# Patient Record
Sex: FEMALE | Race: WHITE | Employment: FULL TIME | URBAN - METROPOLITAN AREA
[De-identification: names, ages, dates, MRNs, and addresses within clinical notes are randomized per-mention and may not be internally consistent; named-entity substitution may affect disease eponyms.]

---

## 2017-01-17 PROBLEM — E78.2 MIXED HYPERLIPIDEMIA: Status: ACTIVE | Noted: 2017-01-17

## 2017-01-17 PROBLEM — M79.89 LEFT ARM SWELLING: Status: ACTIVE | Noted: 2017-01-17

## 2017-01-17 PROBLEM — Z95.2 S/P MVR (MITRAL VALVE REPLACEMENT): Status: ACTIVE | Noted: 2017-01-17

## 2018-08-02 PROBLEM — C73 PAPILLARY THYROID CARCINOMA (HCC): Status: ACTIVE | Noted: 2018-08-02

## 2018-12-17 ENCOUNTER — HOSPITAL ENCOUNTER (OUTPATIENT)
Dept: LAB | Age: 38
Discharge: HOME | End: 2018-12-17
Payer: SELF-PAY

## 2018-12-17 DIAGNOSIS — R06.02 SOB (SHORTNESS OF BREATH): ICD-10-CM

## 2018-12-17 LAB
BASOPHILS # BLD: 0.1 K/UL (ref 0–0.2)
BASOPHILS NFR BLD: 1 % (ref 0–2)
DIFFERENTIAL METHOD BLD: ABNORMAL
EOSINOPHIL # BLD: 0.4 K/UL (ref 0–0.8)
EOSINOPHIL NFR BLD: 5 % (ref 0.5–7.8)
ERYTHROCYTE [DISTWIDTH] IN BLOOD BY AUTOMATED COUNT: 13.4 % (ref 11.9–14.6)
HCT VFR BLD AUTO: 36.3 % (ref 35.8–46.3)
HGB BLD-MCNC: 12.4 G/DL (ref 11.7–15.4)
IMM GRANULOCYTES # BLD: 0 K/UL (ref 0–0.5)
IMM GRANULOCYTES NFR BLD AUTO: 0 % (ref 0–5)
LYMPHOCYTES # BLD: 1.2 K/UL (ref 0.5–4.6)
LYMPHOCYTES NFR BLD: 18 % (ref 13–44)
MCH RBC QN AUTO: 31 PG (ref 26.1–32.9)
MCHC RBC AUTO-ENTMCNC: 34.2 G/DL (ref 31.4–35)
MCV RBC AUTO: 90.8 FL (ref 79.6–97.8)
MONOCYTES # BLD: 0.6 K/UL (ref 0.1–1.3)
MONOCYTES NFR BLD: 8 % (ref 4–12)
NEUTS SEG # BLD: 4.7 K/UL (ref 1.7–8.2)
NEUTS SEG NFR BLD: 68 % (ref 43–78)
NRBC # BLD: 0 K/UL (ref 0–0.2)
PLATELET # BLD AUTO: 256 K/UL (ref 150–450)
PMV BLD AUTO: 10.6 FL (ref 9.4–12.3)
RBC # BLD AUTO: 4 M/UL (ref 4.05–5.2)
WBC # BLD AUTO: 6.9 K/UL (ref 4.3–11.1)

## 2018-12-17 PROCEDURE — 36415 COLL VENOUS BLD VENIPUNCTURE: CPT

## 2018-12-17 PROCEDURE — 85025 COMPLETE CBC W/AUTO DIFF WBC: CPT

## 2019-02-06 PROBLEM — Z95.0 PACEMAKER: Status: ACTIVE | Noted: 2019-02-06

## 2019-02-06 PROBLEM — Z87.74 H/O CONGENITAL ATRIAL SEPTAL DEFECT (ASD) REPAIR: Status: ACTIVE | Noted: 2019-02-06

## 2021-03-08 ENCOUNTER — HOSPITAL ENCOUNTER (OUTPATIENT)
Dept: LAB | Age: 41
Discharge: HOME | End: 2021-03-08
Payer: MEDICAID

## 2021-03-08 DIAGNOSIS — I48.0 PAF (PAROXYSMAL ATRIAL FIBRILLATION) (HCC): ICD-10-CM

## 2021-03-08 LAB
ANION GAP SERPL CALC-SCNC: 4 MMOL/L (ref 7–16)
BASOPHILS # BLD: 0.1 K/UL (ref 0–0.2)
BASOPHILS NFR BLD: 1 % (ref 0–2)
BUN SERPL-MCNC: 7 MG/DL (ref 6–23)
CALCIUM SERPL-MCNC: 8.7 MG/DL (ref 8.3–10.4)
CHLORIDE SERPL-SCNC: 106 MMOL/L (ref 98–107)
CO2 SERPL-SCNC: 29 MMOL/L (ref 21–32)
CREAT SERPL-MCNC: 0.63 MG/DL (ref 0.6–1)
DIFFERENTIAL METHOD BLD: NORMAL
EOSINOPHIL # BLD: 0.2 K/UL (ref 0–0.8)
EOSINOPHIL NFR BLD: 3 % (ref 0.5–7.8)
ERYTHROCYTE [DISTWIDTH] IN BLOOD BY AUTOMATED COUNT: 13.6 % (ref 11.9–14.6)
GLUCOSE SERPL-MCNC: 91 MG/DL (ref 65–100)
HCT VFR BLD AUTO: 36.5 % (ref 35.8–46.3)
HGB BLD-MCNC: 12.4 G/DL (ref 11.7–15.4)
IMM GRANULOCYTES # BLD AUTO: 0 K/UL (ref 0–0.5)
IMM GRANULOCYTES NFR BLD AUTO: 0 % (ref 0–5)
INR PPP: 1
LYMPHOCYTES # BLD: 1.2 K/UL (ref 0.5–4.6)
LYMPHOCYTES NFR BLD: 22 % (ref 13–44)
MAGNESIUM SERPL-MCNC: 2.2 MG/DL (ref 1.8–2.4)
MCH RBC QN AUTO: 30.5 PG (ref 26.1–32.9)
MCHC RBC AUTO-ENTMCNC: 34 G/DL (ref 31.4–35)
MCV RBC AUTO: 89.7 FL (ref 79.6–97.8)
MONOCYTES # BLD: 0.4 K/UL (ref 0.1–1.3)
MONOCYTES NFR BLD: 7 % (ref 4–12)
NEUTS SEG # BLD: 3.8 K/UL (ref 1.7–8.2)
NEUTS SEG NFR BLD: 67 % (ref 43–78)
NRBC # BLD: 0 K/UL (ref 0–0.2)
PLATELET # BLD AUTO: 260 K/UL (ref 150–450)
PMV BLD AUTO: 11.1 FL (ref 9.4–12.3)
POTASSIUM SERPL-SCNC: 4.4 MMOL/L (ref 3.5–5.1)
PROTHROMBIN TIME: 13 SEC (ref 12.5–14.7)
RBC # BLD AUTO: 4.07 M/UL (ref 4.05–5.2)
SODIUM SERPL-SCNC: 139 MMOL/L (ref 136–145)
WBC # BLD AUTO: 5.7 K/UL (ref 4.3–11.1)

## 2021-03-08 PROCEDURE — 85610 PROTHROMBIN TIME: CPT

## 2021-03-08 PROCEDURE — 83735 ASSAY OF MAGNESIUM: CPT

## 2021-03-08 PROCEDURE — 80048 BASIC METABOLIC PNL TOTAL CA: CPT

## 2021-03-08 PROCEDURE — 36415 COLL VENOUS BLD VENIPUNCTURE: CPT

## 2021-03-08 PROCEDURE — 85025 COMPLETE CBC W/AUTO DIFF WBC: CPT

## 2021-03-10 ENCOUNTER — ANESTHESIA EVENT (OUTPATIENT)
Dept: SURGERY | Age: 41
End: 2021-03-10
Payer: MEDICAID

## 2021-03-10 NOTE — PROGRESS NOTES
Patient pre-assessment complete for Pacemaker genetrator change with Dr Dayron Urias scheduled for 3/11/21 at 9:30am, arrival time 7:30am. Patient verified using . Patient instructed to bring all home medications in labeled bottles on the day of procedure. NPO status reinforced. Instructed they can take all other medications excluding vitamins & supplements. Patient verbalizes understanding of all instructions & denies any questions at this time.

## 2021-03-11 ENCOUNTER — ANESTHESIA (OUTPATIENT)
Dept: SURGERY | Age: 41
End: 2021-03-11
Payer: MEDICAID

## 2021-03-11 ENCOUNTER — APPOINTMENT (OUTPATIENT)
Dept: CARDIAC CATH/INVASIVE PROCEDURES | Age: 41
End: 2021-03-11
Attending: INTERNAL MEDICINE
Payer: MEDICAID

## 2021-03-11 ENCOUNTER — HOSPITAL ENCOUNTER (OUTPATIENT)
Age: 41
Setting detail: OUTPATIENT SURGERY
Discharge: HOME | End: 2021-03-11
Attending: INTERNAL MEDICINE | Admitting: INTERNAL MEDICINE
Payer: MEDICAID

## 2021-03-11 VITALS
SYSTOLIC BLOOD PRESSURE: 126 MMHG | RESPIRATION RATE: 15 BRPM | WEIGHT: 165 LBS | DIASTOLIC BLOOD PRESSURE: 69 MMHG | OXYGEN SATURATION: 98 % | TEMPERATURE: 98.5 F | HEART RATE: 80 BPM | BODY MASS INDEX: 29.23 KG/M2 | HEIGHT: 63 IN

## 2021-03-11 DIAGNOSIS — Z95.0 CARDIAC PACEMAKER: ICD-10-CM

## 2021-03-11 LAB
ATRIAL RATE: 63 BPM
CALCULATED P AXIS, ECG09: 50 DEGREES
CALCULATED R AXIS, ECG10: -84 DEGREES
CALCULATED T AXIS, ECG11: 60 DEGREES
DIAGNOSIS, 93000: NORMAL
P-R INTERVAL, ECG05: 176 MS
Q-T INTERVAL, ECG07: 484 MS
QRS DURATION, ECG06: 154 MS
QTC CALCULATION (BEZET), ECG08: 495 MS
VENTRICULAR RATE, ECG03: 63 BPM

## 2021-03-11 PROCEDURE — 77030031139 HC SUT VCRL2 J&J -A

## 2021-03-11 PROCEDURE — C1785 PMKR, DUAL, RATE-RESP: HCPCS

## 2021-03-11 PROCEDURE — 74011250636 HC RX REV CODE- 250/636: Performed by: INTERNAL MEDICINE

## 2021-03-11 PROCEDURE — 33228 REMV&REPLC PM GEN DUAL LEAD: CPT | Performed by: INTERNAL MEDICINE

## 2021-03-11 PROCEDURE — 93005 ELECTROCARDIOGRAM TRACING: CPT | Performed by: INTERNAL MEDICINE

## 2021-03-11 PROCEDURE — 76060000032 HC ANESTHESIA 0.5 TO 1 HR: Performed by: INTERNAL MEDICINE

## 2021-03-11 PROCEDURE — 77030031304 HC WAVWGD EIGR DISP INVO -D

## 2021-03-11 PROCEDURE — 74011000250 HC RX REV CODE- 250: Performed by: INTERNAL MEDICINE

## 2021-03-11 PROCEDURE — 77030041279 HC DRSG PRMSL AG MDII -B

## 2021-03-11 PROCEDURE — 77030022704 HC SUT VLOC COVD -B

## 2021-03-11 PROCEDURE — 74011250636 HC RX REV CODE- 250/636: Performed by: NURSE ANESTHETIST, CERTIFIED REGISTERED

## 2021-03-11 PROCEDURE — 77030008462 HC STPLR SKN PROX J&J -A

## 2021-03-11 PROCEDURE — 33228 REMV&REPLC PM GEN DUAL LEAD: CPT

## 2021-03-11 PROCEDURE — 74011000250 HC RX REV CODE- 250: Performed by: NURSE ANESTHETIST, CERTIFIED REGISTERED

## 2021-03-11 RX ORDER — NALOXONE HYDROCHLORIDE 0.4 MG/ML
0.04 INJECTION, SOLUTION INTRAMUSCULAR; INTRAVENOUS; SUBCUTANEOUS
Status: DISCONTINUED | OUTPATIENT
Start: 2021-03-11 | End: 2021-03-11 | Stop reason: HOSPADM

## 2021-03-11 RX ORDER — MIDAZOLAM HYDROCHLORIDE 1 MG/ML
2 INJECTION, SOLUTION INTRAMUSCULAR; INTRAVENOUS ONCE
Status: DISCONTINUED | OUTPATIENT
Start: 2021-03-11 | End: 2021-03-11 | Stop reason: HOSPADM

## 2021-03-11 RX ORDER — HYDROMORPHONE HYDROCHLORIDE 2 MG/ML
0.5 INJECTION, SOLUTION INTRAMUSCULAR; INTRAVENOUS; SUBCUTANEOUS
Status: DISCONTINUED | OUTPATIENT
Start: 2021-03-11 | End: 2021-03-11 | Stop reason: HOSPADM

## 2021-03-11 RX ORDER — SODIUM CHLORIDE, SODIUM LACTATE, POTASSIUM CHLORIDE, CALCIUM CHLORIDE 600; 310; 30; 20 MG/100ML; MG/100ML; MG/100ML; MG/100ML
INJECTION, SOLUTION INTRAVENOUS
Status: DISCONTINUED | OUTPATIENT
Start: 2021-03-11 | End: 2021-03-11 | Stop reason: HOSPADM

## 2021-03-11 RX ORDER — SODIUM CHLORIDE, SODIUM LACTATE, POTASSIUM CHLORIDE, CALCIUM CHLORIDE 600; 310; 30; 20 MG/100ML; MG/100ML; MG/100ML; MG/100ML
100 INJECTION, SOLUTION INTRAVENOUS CONTINUOUS
Status: DISCONTINUED | OUTPATIENT
Start: 2021-03-11 | End: 2021-03-11 | Stop reason: HOSPADM

## 2021-03-11 RX ORDER — PROPOFOL 10 MG/ML
INJECTION, EMULSION INTRAVENOUS AS NEEDED
Status: DISCONTINUED | OUTPATIENT
Start: 2021-03-11 | End: 2021-03-11 | Stop reason: HOSPADM

## 2021-03-11 RX ORDER — LIDOCAINE HYDROCHLORIDE 20 MG/ML
INJECTION, SOLUTION EPIDURAL; INFILTRATION; INTRACAUDAL; PERINEURAL AS NEEDED
Status: DISCONTINUED | OUTPATIENT
Start: 2021-03-11 | End: 2021-03-11 | Stop reason: HOSPADM

## 2021-03-11 RX ORDER — MIDAZOLAM HYDROCHLORIDE 1 MG/ML
2 INJECTION, SOLUTION INTRAMUSCULAR; INTRAVENOUS
Status: DISCONTINUED | OUTPATIENT
Start: 2021-03-11 | End: 2021-03-11 | Stop reason: HOSPADM

## 2021-03-11 RX ORDER — FENTANYL CITRATE 50 UG/ML
100 INJECTION, SOLUTION INTRAMUSCULAR; INTRAVENOUS ONCE
Status: DISCONTINUED | OUTPATIENT
Start: 2021-03-11 | End: 2021-03-11 | Stop reason: HOSPADM

## 2021-03-11 RX ORDER — LIDOCAINE HYDROCHLORIDE 10 MG/ML
0.1 INJECTION INFILTRATION; PERINEURAL AS NEEDED
Status: DISCONTINUED | OUTPATIENT
Start: 2021-03-11 | End: 2021-03-11 | Stop reason: HOSPADM

## 2021-03-11 RX ORDER — SODIUM CHLORIDE 0.9 % (FLUSH) 0.9 %
5 SYRINGE (ML) INJECTION AS NEEDED
Status: DISCONTINUED | OUTPATIENT
Start: 2021-03-11 | End: 2021-03-11 | Stop reason: HOSPADM

## 2021-03-11 RX ORDER — CEFAZOLIN SODIUM/WATER 2 G/20 ML
2 SYRINGE (ML) INTRAVENOUS ONCE
Status: COMPLETED | OUTPATIENT
Start: 2021-03-11 | End: 2021-03-11

## 2021-03-11 RX ORDER — OXYCODONE HYDROCHLORIDE 5 MG/1
5 TABLET ORAL
Status: DISCONTINUED | OUTPATIENT
Start: 2021-03-11 | End: 2021-03-11 | Stop reason: HOSPADM

## 2021-03-11 RX ORDER — PROPOFOL 10 MG/ML
INJECTION, EMULSION INTRAVENOUS
Status: DISCONTINUED | OUTPATIENT
Start: 2021-03-11 | End: 2021-03-11 | Stop reason: HOSPADM

## 2021-03-11 RX ADMIN — CEFAZOLIN 2 G: 1 INJECTION, POWDER, FOR SOLUTION INTRAVENOUS at 09:59

## 2021-03-11 RX ADMIN — LIDOCAINE HYDROCHLORIDE 40 MG: 20 INJECTION, SOLUTION EPIDURAL; INFILTRATION; INTRACAUDAL; PERINEURAL at 09:56

## 2021-03-11 RX ADMIN — SODIUM CHLORIDE, SODIUM LACTATE, POTASSIUM CHLORIDE, AND CALCIUM CHLORIDE: 600; 310; 30; 20 INJECTION, SOLUTION INTRAVENOUS at 09:47

## 2021-03-11 RX ADMIN — PROPOFOL 140 MCG/KG/MIN: 10 INJECTION, EMULSION INTRAVENOUS at 09:56

## 2021-03-11 RX ADMIN — PROPOFOL 50 MG: 10 INJECTION, EMULSION INTRAVENOUS at 09:56

## 2021-03-11 RX ADMIN — CEFAZOLIN SODIUM 2 G: 10 INJECTION, POWDER, FOR SOLUTION INTRAVENOUS at 14:41

## 2021-03-11 NOTE — ANESTHESIA POSTPROCEDURE EVALUATION
Procedure(s):  PACEMAKER GEN CHANGE.    total IV anesthesia    Anesthesia Post Evaluation      Multimodal analgesia: multimodal analgesia used between 6 hours prior to anesthesia start to PACU discharge  Patient location during evaluation: PACU  Patient participation: complete - patient participated  Level of consciousness: awake  Pain management: adequate  Airway patency: patent  Anesthetic complications: no  Cardiovascular status: acceptable  Respiratory status: spontaneous ventilation and acceptable  Hydration status: acceptable  Post anesthesia nausea and vomiting:  none      INITIAL Post-op Vital signs:   Vitals Value Taken Time   BP 96/78 03/11/21 1227   Temp     Pulse 79 03/11/21 1253   Resp     SpO2 99 % 03/11/21 1253   Vitals shown include unvalidated device data.

## 2021-03-11 NOTE — PROGRESS NOTES
TRANSFER - IN REPORT:    Verbal report received from [address] on Bunny Rued being received from EP LAB for routine progression of care      Report consisted of patients Situation, Background, Assessment and Recommendations(SBAR). Information from the following report(s) Procedure Summary was reviewed with the receiving nurse. Opportunity for questions and clarification was provided. Assessment completed upon patients arrival to unit and care assumed.

## 2021-03-11 NOTE — PROGRESS NOTES
Patient received to [address] [place] [address] room # 9  Ambulatory from [facility]. Patient scheduled for PPM gen change today with Dr Dayron Urias. Procedure reviewed & questions answered, voiced good understanding consent obtained & placed on chart. All medications and medical history reviewed. Will prep patient per orders. Patient & family updated on plan of care.       The patient has a fraility score of 3-MANAGING WELL, based on ability to perform ADLS by self

## 2021-03-11 NOTE — PROGRESS NOTES
Ambulated patient in hallway and back to bed. Left SC incision with old oozing. No hematoma or bleeding.

## 2021-03-11 NOTE — PROCEDURES
Pre-Procedure Diagnosis  1. PPM generator OLENA. 2. Documented non-reversible symptomatic bradycardia due to second degree and/or third degree atrioventricular block. Procedure Performed  1. Dual Chamber PPM Gen Change    Anesthesia: MAC     Estimated Blood Loss: Less than 10 mL     Specimens: * No specimens in log *     Patient Information and Indications: The procedure, indications, risks, benefits, and alternatives were discussed with the patient and family members, who desired to proceed after questions were answered and informed consent was documented. Procedure: After informed consent was obtained, the patient was brought to the Electrophysiology Laboratory in a fasting state and was prepped and draped in sterile fashion. Prophylactic antibiotic was administered prior to skin incision: (Ancef 2gms). Local anesthetic (sensorcaine) was delivered to the left pectoral region and an incision was made directly over the lower prior surgical scar. The subcutaneous pocket was opened using blunt dissection and electrocautery, and adequate hemostasis was established. The device was freed from overlying fibrotic tissue and the leads freed to give enough slack for device exchange. The leads were individually removed from the old generator, the lead pins were then cleaned with antibiotic soaked gauze, dried gently, and attached to a new pacemaker generator. Pins were directly observed to pass the tip electrode, and the ring hex wrench screws were secured, and leads tug tested. The device and leads were gently positioned within the pocket. The pocket was irrigated copiously with a saline antimicrobial solution prior to device positioning. The device and leads were tested a second time prior to pocket closure. The wound was closed with multiple layers of absorbable suture followed by skin closure with staples. The patient tolerated the procedure well and left the lab in good condition.      Pulse Generator Model #  Serial # Location Implant/Explant   [id number] [id number] [id number] Left Pectoral Implant   [id number] [employer] [id number] Left Pectoral Explant     Lead Model Number  Serial Number Lead position Implant/Explant   RA [id number] [id number] [id number] RA Appendage Implanted on 10/13/2004   RV [id number] Biotronik [id number] RV Faber Implanted on 2/13/2012     Lead Sensitivity and Threshold  Lead R or P sensitivity (mv) Threshold (V) Threshold PW (msec) Impedance (ohms) Final output Voltage (V) Final PW (msec)   RA 2.4 1.1 0.4 429 2.0 0.4   RV -- 1.4 0.4 468 3.0 0.4     Bradycardia Settings  Lion Mode LRL URL Pace AVD (ms) Sense AVD (ms) Rate Response Mode Switching Mode SW Rate   DDD-CLS 60 130 140 140 On On 667       Complications: None     Impression: 1. Successful dual chamber PPM generator replacement. Hawk Mathew MD, MS  Clinical Cardiac Electrophysiology  3/11/2021  10:29 AM

## 2021-03-11 NOTE — ANESTHESIA PREPROCEDURE EVALUATION
Relevant Problems   No relevant active problems       Anesthetic History     PONV          Review of Systems / Medical History  Patient summary reviewed and pertinent labs reviewed    Pulmonary            Asthma : well controlled    Comments: Covid Infection Jan 2021   Neuro/Psych   Within defined limits           Cardiovascular      Valvular problems/murmurs (s/p MVR)      Dysrhythmias (PAF. Robert-Gant Syndrome)       Exercise tolerance: >4 METS  Comments: ASD repair at [facility] age 8; MVR at [facility] in 2009. Pacemaker since age 8. Feb 2021- Echo normal SF, EF 65%, ind DF, MVR mean gradient 7 (down from 10), no MR.   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

## 2021-03-11 NOTE — DISCHARGE INSTRUCTIONS
Post Op Device Instructions        Incision & Dressing Care   Please keep Aquacel dressing on wound until your 2 week follow up in device clinic. The dressing promotes healing and is meant to stay on the wound. Keep incision site completely dry for 48 hours. During this time you may take a sponge bath, but no shower. After 48 hours you may shower with your back to the water letting the water run over the dressing. Do not let the water directly hit the dressing, it is water resistant no water proof. Do not use any lotions, creams, or ointments on the incision. Avoid manipulating the device or leads with your fingers. Do not massage or excessively rub the implant site. This could displace the leads      For four weeks after your implant   Do not lift anything heavier than a gallon of milk. Do not raise your elbow above the level of your shoulder on the Left shoulder implant side. Avoid excessive pushing, pulling, or twisting. Call you doctor for any of the following:   Any signs of infection, including redness, swelling or pain at the incision site, or a   temperature of 101° F or greater. If you have twitching chest muscles, hiccups that won't stop, or a swollen arm on the   side of the incision. Any feelings of light-headedness, chest pain, or shortness of breath. Please notify your doctors and dentists that you have an pacemaker. You should not drive until 1 week after your implant or after your first follow-up appointment, whichever comes first. At that time, you can discuss when you may return to your regular driving routine. About 1 month after implant, you will receive a card by mail from the company who manufactured your pacemaker. Your device was manufactured by [employer]. You should carry this card with you at all times. Please call the [address] [place] [address] Drive at  [phone] if you have questions or concerns about your device.  Please call the hospital if it is after 5 pm or the weekend please page the on call cardiologist at [facility] at [address] will need to have your device checked 1- 2 weeks after the procedure and should have an appointment with the device clinic. If you do not hear from them call the device clinic.

## 2021-03-16 PROBLEM — E89.0 POSTOPERATIVE HYPOTHYROIDISM: Status: ACTIVE | Noted: 2019-01-10

## 2021-06-02 PROBLEM — Z79.899 ENCOUNTER FOR MEDICATION MANAGEMENT: Status: ACTIVE | Noted: 2021-06-02

## 2021-06-02 PROBLEM — G43.009 MIGRAINE WITHOUT AURA AND WITHOUT STATUS MIGRAINOSUS, NOT INTRACTABLE: Status: ACTIVE | Noted: 2021-06-02

## 2021-06-02 PROBLEM — G45.9 TIA (TRANSIENT ISCHEMIC ATTACK): Status: ACTIVE | Noted: 2021-06-02

## 2021-06-02 PROBLEM — R20.2 PARESTHESIA: Status: ACTIVE | Noted: 2021-06-02

## 2021-06-02 PROBLEM — G81.94 LEFT HEMIPARESIS (HCC): Status: ACTIVE | Noted: 2021-06-02

## 2021-06-21 ENCOUNTER — HOSPITAL ENCOUNTER (OUTPATIENT)
Dept: CT IMAGING | Age: 41
Discharge: HOME | End: 2021-06-21
Attending: PSYCHIATRY & NEUROLOGY
Payer: MEDICAID

## 2021-06-21 DIAGNOSIS — G45.9 TIA (TRANSIENT ISCHEMIC ATTACK): ICD-10-CM

## 2021-06-21 PROCEDURE — 70450 CT HEAD/BRAIN W/O DYE: CPT

## 2021-06-24 NOTE — PROGRESS NOTES
Reported and reviewed images. Brain images without abnormality. The sligh changes at the sinuses non-specific. No neurologic change to recommend. See PCP or allergist about the sinuses if symptomatic.        Faustino Hatfield MD  Consultative Neurology, Neurodiagnostics and Neurotherapeutics  Neuroelectrophysiology, EEG, EMG  [facility]  [address] [facility], [address] [facility]  Phone:  [phone]  Fax:   [phone]

## 2021-07-26 PROBLEM — F43.10 PTSD (POST-TRAUMATIC STRESS DISORDER): Status: ACTIVE | Noted: 2021-07-26

## 2022-02-14 ENCOUNTER — HOSPITAL ENCOUNTER (OUTPATIENT)
Dept: LAB | Age: 42
Discharge: HOME | End: 2022-02-14
Payer: MEDICAID

## 2022-02-14 ENCOUNTER — HOSPITAL ENCOUNTER (OUTPATIENT)
Dept: GENERAL RADIOLOGY | Age: 42
Discharge: HOME | End: 2022-02-14
Payer: MEDICAID

## 2022-02-14 DIAGNOSIS — R06.02 SHORTNESS OF BREATH: ICD-10-CM

## 2022-02-14 LAB
BASOPHILS # BLD: 0.1 K/UL (ref 0–0.2)
BASOPHILS NFR BLD: 1 % (ref 0–2)
BNP SERPL-MCNC: 191 PG/ML (ref 5–125)
DIFFERENTIAL METHOD BLD: NORMAL
EOSINOPHIL # BLD: 0.1 K/UL (ref 0–0.8)
EOSINOPHIL NFR BLD: 2 % (ref 0.5–7.8)
ERYTHROCYTE [DISTWIDTH] IN BLOOD BY AUTOMATED COUNT: 13.8 % (ref 11.9–14.6)
HCT VFR BLD AUTO: 39.7 % (ref 35.8–46.3)
HGB BLD-MCNC: 13.4 G/DL (ref 11.7–15.4)
IMM GRANULOCYTES # BLD AUTO: 0 K/UL (ref 0–0.5)
IMM GRANULOCYTES NFR BLD AUTO: 0 % (ref 0–5)
LYMPHOCYTES # BLD: 1.7 K/UL (ref 0.5–4.6)
LYMPHOCYTES NFR BLD: 31 % (ref 13–44)
MCH RBC QN AUTO: 29.9 PG (ref 26.1–32.9)
MCHC RBC AUTO-ENTMCNC: 33.8 G/DL (ref 31.4–35)
MCV RBC AUTO: 88.6 FL (ref 79.6–97.8)
MONOCYTES # BLD: 0.4 K/UL (ref 0.1–1.3)
MONOCYTES NFR BLD: 8 % (ref 4–12)
NEUTS SEG # BLD: 3.2 K/UL (ref 1.7–8.2)
NEUTS SEG NFR BLD: 58 % (ref 43–78)
NRBC # BLD: 0 K/UL (ref 0–0.2)
PLATELET # BLD AUTO: 257 K/UL (ref 150–450)
PMV BLD AUTO: 11.3 FL (ref 9.4–12.3)
RBC # BLD AUTO: 4.48 M/UL (ref 4.05–5.2)
WBC # BLD AUTO: 5.4 K/UL (ref 4.3–11.1)

## 2022-02-14 PROCEDURE — 36415 COLL VENOUS BLD VENIPUNCTURE: CPT

## 2022-02-14 PROCEDURE — 71046 X-RAY EXAM CHEST 2 VIEWS: CPT

## 2022-02-14 PROCEDURE — 85025 COMPLETE CBC W/AUTO DIFF WBC: CPT

## 2022-02-14 PROCEDURE — 83880 ASSAY OF NATRIURETIC PEPTIDE: CPT

## 2022-03-18 PROBLEM — E78.2 MIXED HYPERLIPIDEMIA: Status: ACTIVE | Noted: 2017-01-17

## 2022-03-18 PROBLEM — Z87.74 H/O CONGENITAL ATRIAL SEPTAL DEFECT (ASD) REPAIR: Status: ACTIVE | Noted: 2019-02-06

## 2022-03-18 PROBLEM — G81.94 LEFT HEMIPARESIS (HCC): Status: ACTIVE | Noted: 2021-06-02

## 2022-03-18 PROBLEM — Z95.0 PACEMAKER: Status: ACTIVE | Noted: 2019-02-06

## 2022-03-19 PROBLEM — F43.10 PTSD (POST-TRAUMATIC STRESS DISORDER): Status: ACTIVE | Noted: 2021-07-26

## 2022-03-19 PROBLEM — G43.009 MIGRAINE WITHOUT AURA AND WITHOUT STATUS MIGRAINOSUS, NOT INTRACTABLE: Status: ACTIVE | Noted: 2021-06-02

## 2022-03-19 PROBLEM — M79.89 LEFT ARM SWELLING: Status: ACTIVE | Noted: 2017-01-17

## 2022-03-19 PROBLEM — Z95.2 S/P MVR (MITRAL VALVE REPLACEMENT): Status: ACTIVE | Noted: 2017-01-17

## 2022-03-19 PROBLEM — Z79.899 ENCOUNTER FOR MEDICATION MANAGEMENT: Status: ACTIVE | Noted: 2021-06-02

## 2022-03-19 PROBLEM — C73 PAPILLARY THYROID CARCINOMA (HCC): Status: ACTIVE | Noted: 2018-08-02

## 2022-03-19 PROBLEM — G45.9 TIA (TRANSIENT ISCHEMIC ATTACK): Status: ACTIVE | Noted: 2021-06-02

## 2022-03-19 PROBLEM — R20.2 PARESTHESIA: Status: ACTIVE | Noted: 2021-06-02

## 2022-03-20 PROBLEM — E89.0 POSTOPERATIVE HYPOTHYROIDISM: Status: ACTIVE | Noted: 2019-01-10

## 2022-06-13 ENCOUNTER — OFFICE VISIT (OUTPATIENT)
Dept: NEUROLOGY | Age: 42
End: 2022-06-13
Payer: MEDICAID

## 2022-06-13 ENCOUNTER — TELEPHONE (OUTPATIENT)
Dept: CARDIOLOGY CLINIC | Age: 42
End: 2022-06-13

## 2022-06-13 ENCOUNTER — HOSPITAL ENCOUNTER (EMERGENCY)
Dept: GENERAL RADIOLOGY | Age: 42
Discharge: HOME | End: 2022-06-16
Payer: MEDICAID

## 2022-06-13 ENCOUNTER — HOSPITAL ENCOUNTER (EMERGENCY)
Age: 42
Discharge: HOME | End: 2022-06-13
Attending: EMERGENCY MEDICINE
Payer: MEDICAID

## 2022-06-13 VITALS
TEMPERATURE: 98.8 F | WEIGHT: 169 LBS | HEIGHT: 63 IN | HEART RATE: 80 BPM | OXYGEN SATURATION: 99 % | RESPIRATION RATE: 17 BRPM | SYSTOLIC BLOOD PRESSURE: 112 MMHG | BODY MASS INDEX: 29.95 KG/M2 | DIASTOLIC BLOOD PRESSURE: 55 MMHG

## 2022-06-13 VITALS
HEIGHT: 63 IN | WEIGHT: 169 LBS | BODY MASS INDEX: 29.95 KG/M2 | SYSTOLIC BLOOD PRESSURE: 135 MMHG | HEART RATE: 76 BPM | DIASTOLIC BLOOD PRESSURE: 70 MMHG

## 2022-06-13 DIAGNOSIS — G45.9 TIA (TRANSIENT ISCHEMIC ATTACK): ICD-10-CM

## 2022-06-13 DIAGNOSIS — R07.9 ACUTE CHEST PAIN: Primary | ICD-10-CM

## 2022-06-13 DIAGNOSIS — Z95.3 STATUS POST MITRAL VALVE REPLACEMENT WITH BIOPROSTHETIC VALVE: ICD-10-CM

## 2022-06-13 DIAGNOSIS — G43.009 MIGRAINE WITHOUT AURA AND WITHOUT STATUS MIGRAINOSUS, NOT INTRACTABLE: Primary | ICD-10-CM

## 2022-06-13 DIAGNOSIS — Z79.899 ENCOUNTER FOR MEDICATION MANAGEMENT: ICD-10-CM

## 2022-06-13 DIAGNOSIS — H81.10 BENIGN PAROXYSMAL POSITIONAL VERTIGO, UNSPECIFIED LATERALITY: ICD-10-CM

## 2022-06-13 LAB
ALBUMIN SERPL-MCNC: 3.8 G/DL (ref 3.5–5)
ALBUMIN/GLOB SERPL: 1.1 {RATIO} (ref 1.2–3.5)
ALP SERPL-CCNC: 84 U/L (ref 50–130)
ALT SERPL-CCNC: 37 U/L (ref 12–65)
ANION GAP SERPL CALC-SCNC: 7 MMOL/L (ref 7–16)
AST SERPL-CCNC: 19 U/L (ref 15–37)
BILIRUB SERPL-MCNC: 0.4 MG/DL (ref 0.2–1.1)
BUN SERPL-MCNC: 7 MG/DL (ref 6–23)
CALCIUM SERPL-MCNC: 9.1 MG/DL (ref 8.3–10.4)
CHLORIDE SERPL-SCNC: 104 MMOL/L (ref 98–107)
CO2 SERPL-SCNC: 27 MMOL/L (ref 21–32)
CREAT SERPL-MCNC: 0.77 MG/DL (ref 0.6–1)
ERYTHROCYTE [DISTWIDTH] IN BLOOD BY AUTOMATED COUNT: 12.8 % (ref 11.9–14.6)
GLOBULIN SER CALC-MCNC: 3.4 G/DL (ref 2.3–3.5)
GLUCOSE SERPL-MCNC: 91 MG/DL (ref 65–100)
HCT VFR BLD AUTO: 38.3 % (ref 35.8–46.3)
HGB BLD-MCNC: 13.2 G/DL (ref 11.7–15.4)
MCH RBC QN AUTO: 30.3 PG (ref 26.1–32.9)
MCHC RBC AUTO-ENTMCNC: 34.5 G/DL (ref 31.4–35)
MCV RBC AUTO: 88 FL (ref 79.6–97.8)
NRBC # BLD: 0 K/UL (ref 0–0.2)
PLATELET # BLD AUTO: 252 K/UL (ref 150–450)
PMV BLD AUTO: 9.8 FL (ref 9.4–12.3)
POTASSIUM SERPL-SCNC: 4.1 MMOL/L (ref 3.5–5.1)
PROT SERPL-MCNC: 7.2 G/DL (ref 6.3–8.2)
RBC # BLD AUTO: 4.35 M/UL (ref 4.05–5.2)
SODIUM SERPL-SCNC: 138 MMOL/L (ref 136–145)
TROPONIN I SERPL HS-MCNC: 7.9 PG/ML (ref 0–14)
WBC # BLD AUTO: 5.3 K/UL (ref 4.3–11.1)

## 2022-06-13 PROCEDURE — 93005 ELECTROCARDIOGRAM TRACING: CPT | Performed by: EMERGENCY MEDICINE

## 2022-06-13 PROCEDURE — 94762 N-INVAS EAR/PLS OXIMTRY CONT: CPT

## 2022-06-13 PROCEDURE — 84484 ASSAY OF TROPONIN QUANT: CPT

## 2022-06-13 PROCEDURE — 80053 COMPREHEN METABOLIC PANEL: CPT

## 2022-06-13 PROCEDURE — 71046 X-RAY EXAM CHEST 2 VIEWS: CPT

## 2022-06-13 PROCEDURE — 99214 OFFICE O/P EST MOD 30 MIN: CPT | Performed by: PSYCHIATRY & NEUROLOGY

## 2022-06-13 PROCEDURE — 99285 EMERGENCY DEPT VISIT HI MDM: CPT

## 2022-06-13 PROCEDURE — 85027 COMPLETE CBC AUTOMATED: CPT

## 2022-06-13 RX ORDER — FAMOTIDINE 20 MG/1
20 TABLET, FILM COATED ORAL 2 TIMES DAILY
Qty: 30 TABLET | Refills: 0 | Status: SHIPPED | OUTPATIENT
Start: 2022-06-13 | End: 2022-06-28

## 2022-06-13 RX ORDER — SODIUM CHLORIDE 0.9 % (FLUSH) 0.9 %
3 SYRINGE (ML) INJECTION EVERY 8 HOURS
Status: DISCONTINUED | OUTPATIENT
Start: 2022-06-13 | End: 2022-06-13 | Stop reason: HOSPADM

## 2022-06-13 RX ORDER — ONDANSETRON 4 MG/1
4 TABLET, FILM COATED ORAL 3 TIMES DAILY PRN
Qty: 30 TABLET | Refills: 0 | Status: SHIPPED | OUTPATIENT
Start: 2022-06-13

## 2022-06-13 ASSESSMENT — PAIN SCALES - GENERAL: PAINLEVEL_OUTOF10: 4

## 2022-06-13 ASSESSMENT — ENCOUNTER SYMPTOMS
EYES NEGATIVE: 1
GASTROINTESTINAL NEGATIVE: 1
RESPIRATORY NEGATIVE: 1
VOMITING: 0
SHORTNESS OF BREATH: 1
BACK PAIN: 1
ALLERGIC/IMMUNOLOGIC NEGATIVE: 1
BACK PAIN: 0
COUGH: 0
ABDOMINAL PAIN: 1

## 2022-06-13 ASSESSMENT — PAIN - FUNCTIONAL ASSESSMENT: PAIN_FUNCTIONAL_ASSESSMENT: 0-10

## 2022-06-13 ASSESSMENT — VISUAL ACUITY: OU: 1

## 2022-06-13 ASSESSMENT — PAIN DESCRIPTION - LOCATION: LOCATION: CHEST

## 2022-06-13 ASSESSMENT — PAIN DESCRIPTION - DESCRIPTORS: DESCRIPTORS: PRESSURE

## 2022-06-13 NOTE — TELEPHONE ENCOUNTER
Patient called stating she feels dizzy and very tired. She said her chest feels tight and she has been gaining weight without trying. Her BP is 135/78. No active chest pain. Please call patient and advise.

## 2022-06-13 NOTE — ED TRIAGE NOTES
Pt reports 4-5 days midsternal chest heaviness/pressure and dizziness.   (-)fevers, dyspnea, LOC  Hx pacemaker, 2x open heart surgery for valve replacement  A&Ox4

## 2022-06-13 NOTE — LETTER
[address] [facility]  [address] [address]  Phone: [phone]  Fax: [phone]    Nelida Mcdaniel MD    June 13, 2022     Abbie Ramires DO  [facility] [address]    Patient: Dianne Boles   MR Number: [id number]   YOB: 1980   Date of Visit: 6/13/2022       Dear Abbie Ramires: Thank you for referring Loraine Hammond to me for evaluation/treatment. Below are the relevant portions of my assessment and plan of care. If you have questions, please do not hesitate to call me. I look forward to following [facility] along with you.     Sincerely,      Nelida Mcdaniel MD

## 2022-06-13 NOTE — ED PROVIDER NOTES
[facility] Emergency Department Provider Note                   PCP:                Zain Denton DO               Age: 43 y.o. Sex: female     No diagnosis found. DISPOSITION         New Prescriptions    No medications on file       Orders Placed This Encounter   Procedures    XR CHEST (2 VW)    Troponin    CBC    Comprehensive Metabolic Panel    Cardiac Monitor    Pulse Oximetry    Orthostatic blood pressure and pulse    EKG 12 Lead    Saline lock IV        Tomie Dakins, MD 6:56 PM      MDM  Number of Diagnoses or Management Options  Diagnosis management comments: Constant chest pressure 3 to 4 days. Occasional vertigo that is unchanged for the last number of weeks. Sporadic. Doubt pulmonary embolism. Doubt central vertigo. Check EKG and troponin. Check chest x-ray. Check for anemia. Amount and/or Complexity of Data Reviewed  Clinical lab tests: ordered and reviewed  Tests in the radiology section of CPT®: ordered and reviewed  Tests in the medicine section of CPT®: ordered and reviewed  Review and summarize past medical records: yes  Independent visualization of images, tracings, or specimens: yes (My and interpretation of EKG shows a ventricular paced rhythm at 80. Secondary ST changes. No primary T wave changes. No ectopy. Normal QT interval.)    Risk of Complications, Morbidity, and/or Mortality  Presenting problems: moderate  Diagnostic procedures: minimal  Management options: windy Hennessy is a 43 y.o. female who presents to the Emergency Department with chief complaint of    Chief Complaint   Patient presents with    Chest Pain    Dizziness      42-year-old female states 4-day history of substernal chest pressure. Nonradiating. No associated symptoms of nausea vomiting diaphoresis or shortness of breath. No fever chills or cough. Not really exertional.  No particular aggravating relieving factors. Is been with her constantly for 4 days.   No issues like this in the past.  Patient does have some occasional episodes of dizziness where she describes somewhat positional vertigo that is worse with change in position or looking in certain directions. This is been on and off for 3 weeks. Saw her primary care doctor but is not really improved. Episodes last a few seconds or a minute or 2 at a time. She had a slight left headache few days ago. Last about 24 hours. It was gradual onset. She does have some issues with dyspnea on exertion but has been somewhat chronic. Very significant past medical history with mitral valve replacement. Also repair of ASD. Records state history of paroxysmal atrial fibrillation. Patient's had COVID in the past twice. Also history of thyroidectomy due to cancer. She did have radiation treatments but this was previous. Also pacemaker placement as well. No history of coronary artery disease. She called her cardiologist today due to chest pressure and was referred here. No history of DVT or PE. The history is provided by the patient. Chest Pain  Pain location:  Substernal area  Pain quality: pressure    Pain radiates to:  Does not radiate  Pain severity:  Moderate  Onset quality:  Gradual  Duration:  4 days  Timing:  Constant  Progression:  Unchanged  Chronicity:  New  Context: at rest    Context: not breathing and not movement    Relieved by:  Nothing  Worsened by:  Nothing  Associated symptoms: abdominal pain, fatigue, headache and shortness of breath    Associated symptoms: no back pain, no cough, no diaphoresis, no fever, no palpitations, no syncope and no vomiting    Risk factors: no coronary artery disease and no diabetes mellitus          Review of Systems   Constitutional: Positive for fatigue. Negative for diaphoresis and fever. Respiratory: Positive for shortness of breath. Negative for cough. Cardiovascular: Positive for chest pain. Negative for palpitations and syncope.    Gastrointestinal: Positive for abdominal pain. Negative for vomiting. Musculoskeletal: Negative for back pain. Neurological: Positive for headaches. All other systems reviewed and are negative.       Past Medical History:   Diagnosis Date    Arrhythmia     BRADYCARDIA, S/P   pacemaker    ASD (atrial septal defect) 2/14/2012    ASD (atrial septal defect)     Asthma     mild    Cancer ([facility] [address] 2018    papillary thyroid    Chest pain     Congenital heart block     Dizziness     Dyspnea     Extremity pain     First degree AV block     GERD (gastroesophageal reflux disease)     Mitral valve prolapse     Mitral valve regurgitation 3/5/2009    Nausea & vomiting     Other ill-defined conditions(799.89) 2009    MITRAL VALVE REPAIR    Other ill-defined conditions(799.89) 1990    ASD REPAIR    Pacemaker generator end of life 2/14/2012    [facility]     Pain in unspecified limb     Palpitations     PFO (patent foramen ovale) 2/14/2012    Merritt-Henriquez syndrome     Thyroid disease     TIA (transient ischemic attack) 6/2/2021        Past Surgical History:   Procedure Laterality Date    CARDIAC VALVE SURGERY      CHOLECYSTECTOMY      LAP,CHOLECYSTECTOMY      OTHER CELL      MITRAL VALVE REPAIR    PACEMAKER PLACEMENT  03/11/2021    [facility] CARDIAC SURG PROCEDURE UNLIST      ASD repair    VA CARDIAC SURG PROCEDURE UNLIST      pacemaker x2    VA REASD W BYPASS       THYROIDECTOMY      TUBAL LIGATION          Family History   Problem Relation Age of Onset    Heart Disease Mother 46        STENT HEART    Diabetes Father            Social Connections:     Frequency of Communication with Friends and Family: Not on file    Frequency of Social Gatherings with Friends and Family: Not on file    Attends Buddhism Services: Not on file    Active Member of Clubs or Organizations: Not on file    Attends Club or Organization Meetings: Not on file    Marital Status: Not on file        Allergies Allergen Reactions    Codeine Nausea And Vomiting    Penicillins Rash        Vitals signs and nursing note reviewed. Patient Vitals for the past 4 hrs:   Temp Pulse Resp BP SpO2   06/13/22 1815 98.8 °F (37.1 °C) 83 17 123/63 99 %          Physical Exam  Vitals and nursing note reviewed. Constitutional:       Appearance: She is not ill-appearing. HENT:      Head: Normocephalic and atraumatic. Right Ear: External ear normal.      Left Ear: External ear normal.      Mouth/Throat:      Mouth: Mucous membranes are moist.      Pharynx: Oropharynx is clear. Eyes:      General: No scleral icterus. Extraocular Movements: Extraocular movements intact. Pupils: Pupils are equal, round, and reactive to light. Cardiovascular:      Rate and Rhythm: Normal rate and regular rhythm. Pulmonary:      Effort: Pulmonary effort is normal.      Breath sounds: Normal breath sounds. Abdominal:      Palpations: Abdomen is soft. Tenderness: There is no abdominal tenderness. Musculoskeletal:         General: No swelling or tenderness. Cervical back: Normal range of motion and neck supple. Right lower leg: No edema. Left lower leg: No edema. Skin:     General: Skin is warm and dry. Neurological:      General: No focal deficit present. Mental Status: She is alert. Comments: No vertigo. No drift. Normal finger-nose testing. Ambulatory. Procedures      Labs Reviewed   COMPREHENSIVE METABOLIC PANEL - Abnormal; Notable for the following components:       Result Value    Albumin/Globulin Ratio 1.1 (*)     All other components within normal limits   TROPONIN   CBC   TROPONIN        XR CHEST (2 VW)   Final Result   No consolidation.                         Results Include:    Recent Results (from the past 24 hour(s))   Troponin    Collection Time: 06/13/22  6:25 PM   Result Value Ref Range    Troponin, High Sensitivity 7.9 0 - 14 pg/mL   CBC    Collection Time: 06/13/22  6:25 PM   Result Value Ref Range    WBC 5.3 4.3 - 11.1 K/uL    RBC 4.35 4.05 - 5.2 M/uL    Hemoglobin 13.2 11.7 - 15.4 g/dL    Hematocrit 38.3 35.8 - 46.3 %    MCV 88.0 79.6 - 97.8 FL    MCH 30.3 26.1 - 32.9 PG    MCHC 34.5 31.4 - 35.0 g/dL    RDW 12.8 11.9 - 14.6 %    Platelets 825 260 - 341 K/uL    MPV 9.8 9.4 - 12.3 FL    nRBC 0.00 0.0 - 0.2 K/uL   Comprehensive Metabolic Panel    Collection Time: 06/13/22  6:25 PM   Result Value Ref Range    Sodium 138 136 - 145 mmol/L    Potassium 4.1 3.5 - 5.1 mmol/L    Chloride 104 98 - 107 mmol/L    CO2 27 21 - 32 mmol/L    Anion Gap 7 7 - 16 mmol/L    Glucose 91 65 - 100 mg/dL    BUN 7 6 - 23 MG/DL    CREATININE 0.77 0.6 - 1.0 MG/DL    GFR African American >60 >60 ml/min/1.73m2    GFR Non- >60 >60 ml/min/1.73m2    Calcium 9.1 8.3 - 10.4 MG/DL    Total Bilirubin 0.4 0.2 - 1.1 MG/DL    ALT 37 12 - 65 U/L    AST 19 15 - 37 U/L    Alk Phosphatase 84 50 - 130 U/L    Total Protein 7.2 6.3 - 8.2 g/dL    Albumin 3.8 3.5 - 5.0 g/dL    Globulin 3.4 2.3 - 3.5 g/dL    Albumin/Globulin Ratio 1.1 (L) 1.2 - 3.5       Of troponin with 4 days of constant pain. Will have patient recheck with cardiology. Also Pepcid twice daily. Also referral to ENT because of intermittent dizziness for the last few weeks. Suspect peripheral vertigo. Suspect this due to being very intermittent and positional.        Voice dictation software was used during the making of this note. This software is not perfect and grammatical and other typographical errors may be present. This note has not been completely proofread for errors.      Mary Essex, MD  06/13/22 1902       Mary Essex, MD  06/13/22 Mora Michael

## 2022-06-13 NOTE — PROGRESS NOTES
NEUROLOGY  RETURN  OFFICE VISIT [de-identified]                     6/13/2022  Glen Wells is a 43 y.o. female here for [de-identified]    Migraine and TIA and such                                                                  Now dizziness and vertigo and no tinnitus or loss hearing. Referred by    Dr Yuliana Cantu. Chief Complaint:   Chief Complaint   Patient presents with    Follow-up    Migraine     Accompanied[de-identified]   Glen Wells --  Son.       37yo  Woman w     12/15/2021[de-identified]  1. TIA (transient ischemic attack)     2. Intractable migraine without aura and without status migrainosus  -     rimegepant (Nurtec ODT) 75 mg disintegrating tablet; Take 1 Tablet by mouth every fourty-eight (48) hours. Indications: a migraine headache, migraine prevention     3. Left hemiparesis (HCC)     4. Paresthesia     5. Encounter for medication management     Will ask cardiology if she is MRI compatible with the PACEMAKER. *        If wo need MRI brain to order.      The Diagnosis and differential diagnostic considerations, and Rx Tx were reviewed with the patient at length including any changes since last visit. Extensive time[de-identified]  Total time  40 minutes -     More than 50% of this visit was spent in counseling and care coordination. 1.     Will ask Cardiology is she is MRI COMPATIBLE. 2.    Otherwise she is stable for now . I will see in one year. The topiramate +/-    The migraine few --  - some dizziness and vertigo. No blindness. No further TIA. Had cerumen ear canal         Active Problems:    * No active hospital problems. *  Resolved Problems:    * No resolved hospital problems.  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    Chest pain     Congenital heart block     Dizziness     Dyspnea     Extremity pain     First degree AV block     GERD (gastroesophageal reflux disease)     Mitral valve prolapse     Mitral valve regurgitation 3/5/2009    Nausea & vomiting     Other ill-defined conditions(799.89) 2009    MITRAL VALVE REPAIR    Other ill-defined conditions(799.89) 1990    ASD REPAIR    Pacemaker generator end of life 2/14/2012    PAF (paroxysmal atrial fibrillation) (HCC)     Pain in unspecified limb     Palpitations     PFO (patent foramen ovale) 2/14/2012    Merritt-Henriquez syndrome     Thyroid disease     TIA (transient ischemic attack) 6/2/2021     Past Surgical History:   Procedure Laterality Date    CARDIAC VALVE SURGERY      CHOLECYSTECTOMY      LAP,CHOLECYSTECTOMY      OTHER CELL      MITRAL VALVE REPAIR    PACEMAKER PLACEMENT  03/11/2021    HI CARDIAC SURG PROCEDURE UNLIST      ASD repair    HI CARDIAC SURG PROCEDURE UNLIST      pacemaker x2    HI REASD W BYPASS       THYROIDECTOMY      TUBAL LIGATION        Family History   Problem Relation Age of Onset    Heart Disease Mother 46        STENT HEART    Diabetes Father      Social History     Socioeconomic History    Marital status: Legally      Spouse name: None    Number of children: None    Years of education: None    Highest education level: None   Occupational History    None   Tobacco Use    Smoking status: Never Smoker    Smokeless tobacco: Never Used   Substance and Sexual Activity    Alcohol use: No    Drug use: No    Sexual activity: None   Other Topics Concern    None   Social History Narrative    None     Social Determinants of Health     Financial Resource Strain:     Difficulty of Paying Living Expenses: Not on file   Food Insecurity:     Worried About Running Out of Food in the Last Year: Not on file    Ivonne of Food in the Last Year: Not on file   Transportation Needs:     Lack of Transportation (Medical): Not on file    Lack of Transportation (Non-Medical):  Not on file Physical Activity:     Days of Exercise per Week: Not on file    Minutes of Exercise per Session: Not on file   Stress:     Feeling of Stress : Not on file   Social Connections:     Frequency of Communication with Friends and Family: Not on file    Frequency of Social Gatherings with Friends and Family: Not on file    Attends [employer] Services: Not on file    Active Member of [address] [place] [address] or Organizations: Not on file    Attends Club or Organization Meetings: Not on file    Marital Status: Not on file   Intimate Partner Violence:     Fear of Current or Ex-Partner: Not on file    Emotionally Abused: Not on file    Physically Abused: Not on file    Sexually Abused: Not on file   Housing Stability:     Unable to Pay for Housing in the Last Year: Not on file    Number of [place] in the Last Year: Not on file    Unstable Housing in the Last Year: Not on file        Current Outpatient Medications   Medication Sig Dispense Refill    ondansetron (ZOFRAN) 4 MG tablet Take 1 tablet by mouth 3 times daily as needed for Nausea or Vomiting 30 tablet 0    albuterol sulfate  (90 Base) MCG/ACT inhaler Inhale 2 puffs into the lungs as needed      aspirin 81 MG EC tablet Take 81 mg by mouth daily      fluticasone-salmeterol (ADVAIR DISKUS) 100-50 MCG/ACT AEPB diskus inhaler Inhale 1 puff into the lungs every 12 hours      ibuprofen (ADVIL;MOTRIN) 200 MG tablet Take 400 mg by mouth every 6 hours as needed      levothyroxine (SYNTHROID) 100 MCG tablet Take 100 mcg by mouth every morning (before breakfast)      rosuvastatin (CRESTOR) 40 MG tablet Take 40 mg by mouth      topiramate (TOPAMAX) 25 MG tablet Take 25 mg by mouth daily       No current facility-administered medications for this visit. Allergies   Allergen Reactions    Codeine Nausea And Vomiting    Penicillins Rash       REVIEW OTHER RECORDS:    Review of Systems   Constitutional: Negative. HENT: Negative. Eyes: Negative. Respiratory: Negative. Cardiovascular: Negative. Gastrointestinal: Negative. Endocrine: Negative. Genitourinary: Negative. Musculoskeletal: Positive for back pain and neck pain. Negative for myalgias. Skin: Negative. Allergic/Immunologic: Negative. Neurological: Positive for dizziness (vertigo and lightheadedness. ), light-headedness and headaches. Negative for tremors, seizures, syncope, facial asymmetry, speech difficulty, weakness and numbness. Hematological: Negative. Psychiatric/Behavioral: Negative. All other systems reviewed and are negative. REVIEW IMAGING:     Objective:     Vitals:    06/13/22 1413   BP: 135/70   Site: Right Upper Arm   Position: Sitting   Pulse: 76   Weight: 169 lb (76.7 kg)   Height: 5' 3\" (1.6 m)        Physical Exam  Vitals reviewed. Constitutional:       General: She is awake. She is not in acute distress. Appearance: She is well-developed and well-groomed. She is not ill-appearing, toxic-appearing or diaphoretic. HENT:      Head: Normocephalic and atraumatic. No raccoon eyes, abrasion, contusion, right periorbital erythema or left periorbital erythema. Right Ear: Hearing normal.      Left Ear: Hearing normal.   Eyes:      General: Lids are normal. Vision grossly intact. No visual field deficit or scleral icterus. Right eye: No discharge. Left eye: No discharge. Extraocular Movements: Extraocular movements intact. Right eye: Normal extraocular motion and no nystagmus. Left eye: Normal extraocular motion and no nystagmus. Conjunctiva/sclera: Conjunctivae normal.      Right eye: Right conjunctiva is not injected. Left eye: Left conjunctiva is not injected. Pupils: Pupils are equal, round, and reactive to light. Neck:      Trachea: Phonation normal.   Pulmonary:      Effort: Pulmonary effort is normal. No respiratory distress. Breath sounds: No wheezing.    Musculoskeletal: General: No swelling, tenderness, deformity or signs of injury. Normal range of motion. Cervical back: Normal range of motion. No signs of trauma, rigidity or torticollis. Normal range of motion. Right lower leg: No edema. Left lower leg: No edema. Skin:     General: Skin is warm and dry. Coloration: Skin is not cyanotic, jaundiced or pale. Nails: There is no clubbing. Neurological:      Mental Status: She is alert and easily aroused. Mental status is at baseline. Cranial Nerves: No cranial nerve deficit, dysarthria or facial asymmetry. Sensory: Sensation is intact. Motor: No weakness, atrophy, abnormal muscle tone or seizure activity. Coordination: Coordination is intact. Coordination normal.      Gait: Gait is intact. Gait normal.   Psychiatric:         Attention and Perception: Attention normal.         Mood and Affect: Mood normal.         Speech: Speech normal.         Behavior: Behavior normal. Behavior is cooperative. Neurologic Exam     Mental Status   Attention: normal. Concentration: normal.   Speech: speech is normal   Level of consciousness: alert  Knowledge: good and consistent with education. Normal comprehension. Cranial Nerves   Cranial nerves II through XII intact. CN III, IV, VI   Pupils are equal, round, and reactive to light. Motor Exam   Muscle bulk: normal  Overall muscle tone: normal    Sensory Exam   Light touch normal.     Gait, Coordination, and Reflexes     Gait  Gait: normal    Tremor   Resting tremor: absent  Intention tremor: absent  Action tremor: absent  There is no tic, twitch, tonic or clonic activity noted. Assessment & Plan     Francesco Borrego was seen today for follow-up and migraine. Diagnoses and all orders for this visit:    Migraine without aura and without status migrainosus, not intractable  -     ondansetron (ZOFRAN) 4 MG tablet;  Take 1 tablet by mouth 3 times daily as needed for Nausea or Vomiting    TIA (transient ischemic attack)    Encounter for medication management    Status post mitral valve replacement with bioprosthetic valve         Can see PRN. 1.   Samples  Qulipta and Trokendi. 2.   Vest exercises. All drugs and rx reviewed fully with patient and acknowledged spcific to neurology as well. Differential diagnostic decisions and thoughts reviewed and discussed. Updating to ID pt, coordinate neurology visits, reasons for follow, review neurologic problems. Extensive time[de-identified]  Total time  33 minutes -       More than 50% of this visit was spent in counseling and care coordination. Treatment options fully reviewed with patient. Time includes pre-  and post- face-face time in records review, and preparation including available pertinent images and reports. Acknowledgements obtained where needed.    [ *NOTE: parts of this note were produced using artificial speech recognition software, which may have inadvertent errors in the produced wordings. ]          Steffanie Gonzalez MD  Consultative Neurology, [address] [facility] Sylvie Rodriguez [address] [place] [address], [address] [place] [address]  Phone:  [phone]  Fax:   [phone]        Signed By: Steffanie Gonzalez MD     June 13, 2022

## 2022-06-13 NOTE — PATIENT INSTRUCTIONS
Patient Education        Deciding About Taking Medicine to Prevent Migraines  How can you decide about taking medicine to prevent migraine headaches? What are migraines? Migraines are painful, throbbing headaches. They can last from 4 to 72 hours. They often occur on only one side of your head. But you may feel them on bothsides. The pain may keep you from doing your daily activities. You may take a daily medicine if you get bad migraines often. This can helpprevent them. What are key points about this decision?  Medicines to prevent migraines may not stop them every time. But if you take them daily, you can reduce how many migraines you get by more than half. They can also reduce how long migraines last. And your symptoms may not be as bad.  Medicines that prevent migraines may cause side effects. You may have sleep and memory problems, upset stomach, dry mouth, or constipation. Some of these side effects may last for as long as you take the medicine. Or they may go away within a few weeks. Why might you choose to take medicine to prevent migraines?  You are willing to take medicine daily if it will help your symptoms.  You don't think the side effects of the medicine could be as bad as your migraine symptoms.  Your migraines get in the way of your work. Or they harm your relationships with friends and family.  Benefits of medicine include fewer or no migraines. And your migraines may not last as long or feel as bad. Why might you choose not to take medicine to prevent migraines?  You want to avoid the side effects of the medicine.  You don't want to take medicine every day.  Your migraines are not affecting your work and relationships.  If your symptoms don't improve with home treatment and other medicines, you can decide later to take medicine every day to help prevent migraines.   Your decision  Thinking about the facts and your feelings can help you make a decision that is right for you. Be sure you understand the benefits and risks of your options,and think about what else you need to do before you make the decision. Where can you learn more? Go to https://[employer]. org and sign in to your [employer] account. Enter F148 in the The Simple box to learn more about \"Deciding About Taking Medicine to Prevent Migraines. \"     If you do not have an account, please click on the \"Sign Up Now\" link. Current as of: December 13, 2021               Content Version: 13.2  © 4613-5151 Healthwise, Incorporated. Care instructions adapted under license by [facility] ([facility]). If you have questions about a medical condition or this instruction, always ask your healthcare professional. [address] any warranty or liability for your use of this information.

## 2022-06-13 NOTE — TELEPHONE ENCOUNTER
Patient called stating that she has chest heaviness like someone standing on her chest , nausea for 4-5 days. Having dizziness since getting wax out of ears. PCP has given medication for this, but it \"knocks me out\" so I don't take it as much. Having SOB that is the same as last visit. Patient advised to go to ER for evaluation.  Patient voiced understanding//[email or web address]

## 2022-06-14 LAB
EKG ATRIAL RATE: 80 BPM
EKG DIAGNOSIS: NORMAL
EKG P AXIS: 57 DEGREES
EKG P-R INTERVAL: 154 MS
EKG Q-T INTERVAL: 420 MS
EKG QRS DURATION: 136 MS
EKG QTC CALCULATION (BAZETT): 484 MS
EKG R AXIS: -78 DEGREES
EKG T AXIS: 31 DEGREES
EKG VENTRICULAR RATE: 80 BPM

## 2022-06-14 NOTE — ED NOTES
I have reviewed discharge instructions with the patient. The patient verbalized understanding. Patient left ED via Discharge Method: ambulatory to Home with spouse    Opportunity for questions and clarification provided. Patient given 1 scripts. To continue your aftercare when you leave the hospital, you may receive an automated call from our care team to check in on how you are doing. This is a free service and part of our promise to provide the best care and service to meet your aftercare needs.  If you have questions, or wish to unsubscribe from this service please call [phone]. Thank you for Choosing our [facility] Emergency Department.         Katie Hill RN  06/13/22 2020

## 2022-06-27 ENCOUNTER — OFFICE VISIT (OUTPATIENT)
Dept: CARDIOLOGY CLINIC | Age: 42
End: 2022-06-27
Payer: MEDICAID

## 2022-06-27 VITALS
HEART RATE: 88 BPM | HEIGHT: 63 IN | DIASTOLIC BLOOD PRESSURE: 88 MMHG | SYSTOLIC BLOOD PRESSURE: 112 MMHG | BODY MASS INDEX: 29.77 KG/M2 | WEIGHT: 168 LBS

## 2022-06-27 DIAGNOSIS — Z95.0 PACEMAKER: ICD-10-CM

## 2022-06-27 DIAGNOSIS — Z95.3 STATUS POST MITRAL VALVE REPLACEMENT WITH BIOPROSTHETIC VALVE: Primary | ICD-10-CM

## 2022-06-27 DIAGNOSIS — Z87.74 H/O CONGENITAL ATRIAL SEPTAL DEFECT (ASD) REPAIR: ICD-10-CM

## 2022-06-27 PROCEDURE — 99213 OFFICE O/P EST LOW 20 MIN: CPT | Performed by: INTERNAL MEDICINE

## 2022-06-27 NOTE — PROGRESS NOTES
[facility] CARDIOLOGY  [address] [facility], [address]  PHONE: [phone]      22    NAME:  Chaz Tang  : 1980  MRN: [id number]         SUBJECTIVE:   Chaz Tang is a 43 y.o. female seen for a follow up visit regarding the following:     Chief Complaint   Patient presents with    Shortness of Breath    Follow-up     6 months            HPI:  Follow up  Shortness of Breath and Follow-up (6 months)   . Prior MVR, asd repair and pacemaker, chronic dyspnea, fatigue and chest pain, COVID twice ( and Oct 2021), went to ER  with 4 days of constant chest discomfort, evaluation negative, discharged on BID  also with ongoing intermittent vertigo for which they referred her to ENT. She is feeling better on the BID reflux meds. 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 Remains off topamax for migraines but felt too badly on the medication, the migraines are doing ok on their own. Suffers with anxiety and PTSD           Past cardiac history:   ASD repair at [facility] age 8; MVR at [facility] in 2009. Pacemaker since age 8    Echo  - normal LV function, mildly increased MVR gradients, mild to mod AI, RVSP 43.    2021- COVID-19 infection   2021- Echo normal SF, EF 65%, ind DF, MVR mean gradient 7 (down from 10), no MR. Mild to mod AI   Dec 2021- low normal EF 50%, normal MVR mean gradient 5, mild AI, TDS          Past Medical History, Past Surgical History, Family history, Social History, and Medications were all reviewed with the patient today and updated as necessary. Prior to Admission medications    Medication Sig Start Date End Date Taking?  Authorizing Provider   ondansetron (ZOFRAN) 4 MG tablet Take 1 tablet by mouth 3 times daily as needed for Nausea or Vomiting 22  Yes Donna Lira MD   famotidine (PEPCID) 20 MG tablet Take 1 tablet by mouth 2 times daily for 15 days 22 6/28/22 Yes Kaylen Caban MD   albuterol sulfate  (90 Base) MCG/ACT inhaler Inhale 2 puffs into the lungs as needed   Yes Ar Automatic Reconciliation   aspirin 81 MG EC tablet Take 81 mg by mouth daily   Yes Ar Automatic Reconciliation   fluticasone-salmeterol (ADVAIR DISKUS) 100-50 MCG/ACT AEPB diskus inhaler Inhale 1 puff into the lungs every 12 hours   Yes Ar Automatic Reconciliation   ibuprofen (ADVIL;MOTRIN) 200 MG tablet Take 400 mg by mouth every 6 hours as needed   Yes Ar Automatic Reconciliation   levothyroxine (SYNTHROID) 100 MCG tablet Take 100 mcg by mouth every morning (before breakfast) 7/27/21  Yes Ar Automatic Reconciliation   rosuvastatin (CRESTOR) 40 MG tablet Take 40 mg by mouth 7/26/21  Yes Ar Automatic Reconciliation   topiramate (TOPAMAX) 25 MG tablet Take 25 mg by mouth daily  Patient not taking: Reported on 6/27/2022    Ar Automatic Reconciliation     Allergies   Allergen Reactions    Codeine Nausea And Vomiting    Penicillins Rash     Past Medical History:   Diagnosis Date    Arrhythmia     BRADYCARDIA, S/P   pacemaker    ASD (atrial septal defect) 2/14/2012    ASD (atrial septal defect)     Asthma     mild    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    Chest pain     Congenital heart block     Dizziness     Dyspnea     Extremity pain     First degree AV block     GERD (gastroesophageal reflux disease)     Mitral valve prolapse     Mitral valve regurgitation 3/5/2009    Nausea & vomiting     Other ill-defined conditions(799.89) 2009    MITRAL VALVE REPAIR    Other ill-defined conditions(799.89) 1990    ASD REPAIR    Pacemaker generator end of life 2/14/2012    PAF (paroxysmal atrial fibrillation) (HCC)     Pain in unspecified limb     Palpitations     PFO (patent foramen ovale) 2/14/2012    Merritt-Henriquez syndrome     Thyroid disease     TIA (transient ischemic attack) 6/2/2021     Past Surgical History:   Procedure Laterality Date    CARDIAC VALVE SURGERY  CHOLECYSTECTOMY      LAP,CHOLECYSTECTOMY      OTHER CELL      MITRAL VALVE REPAIR    PACEMAKER PLACEMENT  03/11/2021    MI CARDIAC SURG PROCEDURE UNLIST      ASD repair    MI CARDIAC SURG PROCEDURE UNLIST      pacemaker x2    MI REASD W BYPASS       THYROIDECTOMY      TUBAL LIGATION       Family History   Problem Relation Age of Onset    Heart Disease Mother 46        STENT HEART    Diabetes Father      Social History     Tobacco Use    Smoking status: Never Smoker    Smokeless tobacco: Never Used   Substance Use Topics    Alcohol use: No       ROS:    Review of Systems   Constitutional: Positive for malaise/fatigue. Cardiovascular: Negative for chest pain (resolved with antacid). Psychiatric/Behavioral: The patient is nervous/anxious. PHYSICAL EXAM:   /88   Pulse 88   Ht 5' 3\" (1.6 m)   Wt 168 lb (76.2 kg) Comment: with shoes  BMI 29.76 kg/m²      Wt Readings from Last 3 Encounters:   06/27/22 168 lb (76.2 kg)   06/13/22 169 lb (76.7 kg)   06/13/22 169 lb (76.7 kg)     BP Readings from Last 3 Encounters:   06/27/22 112/88   06/13/22 (!) 112/55   06/13/22 135/70     Pulse Readings from Last 3 Encounters:   06/27/22 88   06/13/22 80   06/13/22 76           Physical Exam  Constitutional:       Appearance: Normal appearance. HENT:      Head: Normocephalic and atraumatic. Eyes:      Conjunctiva/sclera: Conjunctivae normal.   Neck:      Vascular: No carotid bruit. Cardiovascular:      Rate and Rhythm: Normal rate and regular rhythm. Heart sounds: No murmur heard. No friction rub. No gallop. Pulmonary:      Effort: No respiratory distress. Breath sounds: No wheezing or rales. Musculoskeletal:         General: No swelling. Cervical back: Neck supple. Skin:     General: Skin is warm and dry. Neurological:      General: No focal deficit present. Mental Status: She is alert.    Psychiatric:         Mood and Affect: Mood normal.         Behavior: Behavior normal.         Medical problems and test results were reviewed with the patient today. DATA REVIEW    LIPID PANEL     Lab Results   Component Value Date    CHOL 151 07/26/2021    CHOL 268 (H) 03/17/2021     Lab Results   Component Value Date    TRIG 78 07/26/2021    TRIG 84 03/17/2021     Lab Results   Component Value Date    HDL 44 07/26/2021    HDL 53 03/17/2021     Lab Results   Component Value Date    LDLCALC 92 07/26/2021    LDLCALC 201 (H) 03/17/2021     Lab Results   Component Value Date    VLDL 15 07/26/2021    VLDL 14 03/17/2021     No results found for: CHOLHDLRATIO    BMP  Lab Results   Component Value Date     06/13/2022    K 4.1 06/13/2022     06/13/2022    CO2 27 06/13/2022    BUN 7 06/13/2022    CREATININE 0.77 06/13/2022    GLUCOSE 91 06/13/2022    CALCIUM 9.1 06/13/2022      Lab Results   Component Value Date    WBC 5.3 06/13/2022    HGB 13.2 06/13/2022    HCT 38.3 06/13/2022    MCV 88.0 06/13/2022     06/13/2022       EKG    6/13/22 - ventricular pacemaker    CXR/IMAGING    6/13/22 - normal CXR    DEVICE INTERROGATION        OUTSIDE RECORDS REVIEW    Records from outside providers have been reviewed and summarized as noted in the HPI, past history and data review sections of this note, and reviewed with patient. .       ASSESSMENT and PLAN    Smitha was seen today for shortness of breath and follow-up. Diagnoses and all orders for this visit:    Status post mitral valve replacement with bioprosthetic valve  -     Transthoracic echocardiogram (TTE) complete with contrast, bubble, strain, and 3D PRN; Future    H/O congenital atrial septal defect (ASD) repair  -     Transthoracic echocardiogram (TTE) complete with contrast, bubble, strain, and 3D PRN; Future    Pacemaker          IMPRESSION:    Actually doing pretty well recently. CP resolved with treatment for GERD. Vertigo mild and intermittent, does some of the exercise, encouraged to do more frequently. Stable valve disease, continue regular echo surveillance    Normal pacemaker function, no significant arrhythmia. Return in about 6 months (around 12/27/2022). Thank you for allowing me to participate in this patient's care. Please call or contact me if there are any questions or concerns regarding the above.       Wanda Bryan MD  06/27/22  2:06 PM

## 2022-07-01 ENCOUNTER — TELEPHONE (OUTPATIENT)
Dept: NEUROLOGY | Age: 42
End: 2022-07-01

## 2022-07-08 ENCOUNTER — TELEPHONE (OUTPATIENT)
Dept: NEUROLOGY | Age: 42
End: 2022-07-08

## 2022-09-20 PROCEDURE — 93294 REM INTERROG EVL PM/LDLS PM: CPT | Performed by: INTERNAL MEDICINE

## 2022-09-20 PROCEDURE — 93296 REM INTERROG EVL PM/IDS: CPT | Performed by: INTERNAL MEDICINE

## 2022-11-11 DIAGNOSIS — I45.9 STOKES-ADAMS SYNDROME: ICD-10-CM

## 2022-11-11 DIAGNOSIS — Z95.0 PACEMAKER: Primary | ICD-10-CM

## 2022-11-11 DIAGNOSIS — Z95.0 PACEMAKER: ICD-10-CM

## 2022-11-11 DIAGNOSIS — I48.0 PAF (PAROXYSMAL ATRIAL FIBRILLATION) (HCC): ICD-10-CM

## 2023-01-19 NOTE — PROGRESS NOTES
[facility] CARDIOLOGY  [address] [facility], [address]  PHONE: [phone]      23    NAME:  Marilyn Kaiser  : 1980  MRN: [id number]         SUBJECTIVE:   Marilyn Kaiser is a 43 y.o. female seen for a follow up visit regarding the following:     Chief Complaint   Patient presents with    Hyperlipidemia    Irregular Heart Beat              HPI:  Follow up  Hyperlipidemia and Irregular Heart Beat   . Prior MVR, asd repair and pacemaker, chronic dyspnea, fatigue and chest pain,  anxiety, PTSD. Echo continues to look well. She feels well lately though PCP and she struggling to get her thyroid controlled, since increasing  her dosage, some of her prior symptoms have improved. She is walking a lot at work, working at a school, and she and her SO are joining the [employer] today. They are also working on healthier diet and inquire about information which is provided. Past cardiac history:   ASD repair at [facility] age 8; MVR at [facility] in . Pacemaker since age 8    Echo  - normal LV function, mildly increased MVR gradients, mild to mod AI, RVSP 43.    2021- COVID-19 infection   2021- Echo normal SF, EF 65%, ind DF, MVR mean gradient 7 (down from 10), no MR. Mild to mod AI   Dec 2021- low normal EF 50%, normal MVR mean gradient 5, mild AI, TDS  2023       Echo - EF 50-55%, mild AI, normal MVR, mod LAE, mild ERMA          Key CAD CHF Meds            rosuvastatin (CRESTOR) 40 MG tablet (Taking)    Class: Historical Med          Key Antihyperglycemic Medications       Patient is on no antihyperglycemic meds. Past Medical History, Past Surgical History, Family history, Social History, and Medications were all reviewed with the patient today and updated as necessary. Prior to Admission medications    Medication Sig Start Date End Date Taking?  Authorizing Provider   ondansetron (ZOFRAN) 4 MG tablet Take 1 tablet by mouth 3 times daily as needed for Nausea or Vomiting 6/13/22  Yes Emerson Schilling MD   famotidine (PEPCID) 20 MG tablet Take 1 tablet by mouth 2 times daily for 15 days 6/13/22 1/20/23 Yes Flory Boyd MD   albuterol sulfate  (90 Base) MCG/ACT inhaler Inhale 2 puffs into the lungs as needed   Yes Ar Automatic Reconciliation   aspirin 81 MG EC tablet Take 81 mg by mouth daily   Yes Ar Automatic Reconciliation   fluticasone-salmeterol (ADVAIR) 100-50 MCG/ACT AEPB diskus inhaler Inhale 1 puff into the lungs every 12 hours   Yes Ar Automatic Reconciliation   ibuprofen (ADVIL;MOTRIN) 200 MG tablet Take 400 mg by mouth every 6 hours as needed   Yes Ar Automatic Reconciliation   levothyroxine (SYNTHROID) 100 MCG tablet Take 100 mcg by mouth every morning (before breakfast) 7/27/21  Yes Ar Automatic Reconciliation   rosuvastatin (CRESTOR) 40 MG tablet Take 40 mg by mouth 7/26/21  Yes Ar Automatic Reconciliation     Allergies   Allergen Reactions    Codeine Nausea And Vomiting    Penicillins Rash     Past Medical History:   Diagnosis Date    Arrhythmia     BRADYCARDIA, S/P   pacemaker    ASD (atrial septal defect) 2/14/2012    ASD (atrial septal defect)     Asthma     mild    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    Chest pain     Congenital heart block     Dizziness     Dyspnea     Extremity pain     First degree AV block     GERD (gastroesophageal reflux disease)     Mitral valve prolapse     Mitral valve regurgitation 3/5/2009    Nausea & vomiting     Other ill-defined conditions(799.89) 2009    MITRAL VALVE REPAIR    Other ill-defined conditions(799.89) 1990    ASD REPAIR    Pacemaker generator end of life 2/14/2012    PAF (paroxysmal atrial fibrillation) (HCC)     Pain in unspecified limb     Palpitations     PFO (patent foramen ovale) 2/14/2012    Merritt-Henriquez syndrome     Thyroid disease     TIA (transient ischemic attack) 6/2/2021     Past Surgical History:   Procedure Laterality Date    CARDIAC VALVE SURGERY CHOLECYSTECTOMY      LAP,CHOLECYSTECTOMY      OTHER CELL      MITRAL VALVE REPAIR    PACEMAKER PLACEMENT  03/11/2021    [place] REASD W BYPASS       [place] UNLISTED PROCEDURE CARDIAC SURGERY      ASD repair    [place] UNLISTED PROCEDURE CARDIAC SURGERY      pacemaker x2    THYROIDECTOMY      TUBAL LIGATION       Family History   Problem Relation Age of Onset    Heart Disease Mother 51        STENT HEART    Diabetes Father      Social History     Tobacco Use    Smoking status: Never    Smokeless tobacco: Never   Substance Use Topics    Alcohol use: No       ROS:    Review of Systems   Cardiovascular:  Negative for chest pain.   Respiratory:  Negative for shortness of breath.         PHYSICAL EXAM:   /72   Pulse 80   Ht 5' 3\" (1.6 m)   Wt 175 lb (79.4 kg)   BMI 31.00 kg/m²      Wt Readings from Last 3 Encounters:   01/20/23 175 lb (79.4 kg)   01/12/23 168 lb (76.2 kg)   06/27/22 168 lb (76.2 kg)     BP Readings from Last 3 Encounters:   01/20/23 138/72   06/27/22 112/88   06/13/22 (!) 112/55     Pulse Readings from Last 3 Encounters:   01/20/23 80   06/27/22 88   06/13/22 80           Physical Exam  Constitutional:       Appearance: Normal appearance.   HENT:      Head: Normocephalic and atraumatic.   Eyes:      Conjunctiva/sclera: Conjunctivae normal.   Neck:      Vascular: No carotid bruit.   Cardiovascular:      Rate and Rhythm: Normal rate and regular rhythm.      Heart sounds: Murmur heard.   Holosystolic murmur is present with a grade of 2/6 at the lower left sternal border radiating to the lower right sternal border.     No friction rub. No gallop.   Pulmonary:      Effort: No respiratory distress.      Breath sounds: No wheezing or rales.   Musculoskeletal:         General: No swelling.      Cervical back: Neck supple.   Skin:     General: Skin is warm and dry.   Neurological:      General: No focal deficit present.      Mental Status: She is alert.   Psychiatric:         Mood and Affect: Mood normal.          Behavior: Behavior normal.       Medical problems and test results were reviewed with the patient today. DATA REVIEW    LIPID PANEL     Lab Results   Component Value Date    CHOL 151 07/26/2021    CHOL 268 (H) 03/17/2021     Lab Results   Component Value Date    TRIG 78 07/26/2021    TRIG 84 03/17/2021     Lab Results   Component Value Date    HDL 44 07/26/2021    HDL 53 03/17/2021     Lab Results   Component Value Date    LDLCALC 92 07/26/2021    LDLCALC 201 (H) 03/17/2021     Lab Results   Component Value Date    VLDL 15 07/26/2021    VLDL 14 03/17/2021     No results found for: CHOLHDLRATIO    BMP  Lab Results   Component Value Date/Time     06/13/2022 06:25 PM    K 4.1 06/13/2022 06:25 PM     06/13/2022 06:25 PM    CO2 27 06/13/2022 06:25 PM    BUN 7 06/13/2022 06:25 PM    CREATININE 0.77 06/13/2022 06:25 PM    GLUCOSE 91 06/13/2022 06:25 PM    CALCIUM 9.1 06/13/2022 06:25 PM          EKG        CXR/IMAGING        DEVICE INTERROGATION        OUTSIDE RECORDS REVIEW    Records from outside providers have been reviewed and summarized as noted in the HPI, past history and data review sections of this note, and reviewed with patient. .       ASSESSMENT and Troy Rash was seen today for hyperlipidemia and irregular heart beat. Diagnoses and all orders for this visit:    ASD (atrial septal defect)    Rheumatic mitral regurgitation  -     Transthoracic echocardiogram (TTE) complete with contrast, bubble, strain, and 3D PRN; Future    Cardiac pacemaker    Merritt-Henriquez syndrome    S/P MVR (mitral valve replacement)  -     Transthoracic echocardiogram (TTE) complete with contrast, bubble, strain, and 3D PRN; Future        IMPRESSION:    Stable valve disease, continue regular echo surveillance    Normal pacer function    Congratulated efforts to be healthier.   Patient is encouraged to engage in moderate physical activity for at least 30 minutes on at least 6 days of the week, and to follow a diet rich in whole grains, fruits and vegetables, proven to reduce the incidence of events related to cardiovascular disease.  For more detailed information, patient is referred to www.cardiosmart.org.        Return in about 1 year (around 1/20/2024) for ECHO BEFORE VISIT.          Thank you for allowing me to participate in this patient's care.  Please call or contact me if there are any questions or concerns regarding the above.      SHASTA GILMORE MD  01/20/23  8:42 AM

## 2023-01-20 ENCOUNTER — OFFICE VISIT (OUTPATIENT)
Dept: CARDIOLOGY CLINIC | Age: 43
End: 2023-01-20
Payer: MEDICAID

## 2023-01-20 VITALS
BODY MASS INDEX: 31.01 KG/M2 | DIASTOLIC BLOOD PRESSURE: 72 MMHG | SYSTOLIC BLOOD PRESSURE: 138 MMHG | HEIGHT: 63 IN | HEART RATE: 80 BPM | WEIGHT: 175 LBS

## 2023-01-20 DIAGNOSIS — Q21.10 ASD (ATRIAL SEPTAL DEFECT): Primary | ICD-10-CM

## 2023-01-20 DIAGNOSIS — Z95.0 CARDIAC PACEMAKER: ICD-10-CM

## 2023-01-20 DIAGNOSIS — I45.9 STOKES-ADAMS SYNDROME: ICD-10-CM

## 2023-01-20 DIAGNOSIS — Z95.2 S/P MVR (MITRAL VALVE REPLACEMENT): ICD-10-CM

## 2023-01-20 DIAGNOSIS — I05.1 RHEUMATIC MITRAL REGURGITATION: ICD-10-CM

## 2023-01-20 PROCEDURE — 99214 OFFICE O/P EST MOD 30 MIN: CPT | Performed by: INTERNAL MEDICINE

## 2023-01-20 ASSESSMENT — ENCOUNTER SYMPTOMS: SHORTNESS OF BREATH: 0

## 2023-01-20 NOTE — PATIENT INSTRUCTIONS
Patient Education        Learning About the Mediterranean Diet  What is the [address] [address]? The Mediterranean diet is a style of eating rather than a diet plan. It features foods eaten in [place], [place], [place] and [place], and other countries along the [facility]. It emphasizes eating foods like fish, fruits, vegetables, beans, high-fiber breads and whole grains, nuts, and olive oil. This style of eating includes limited red meat, cheese, and sweets. Why choose the Mediterranean diet? A Mediterranean-style diet may improve heart health. It contains more fat than other heart-healthy diets. But the fats are mainly from nuts, unsaturated oils (such as fish oils and olive oil), and certain nut or seed oils (such as canola, soybean, or flaxseed oil). These fats may help protect the heart and blood vessels. How can you get started on the Mediterranean diet? Here are some things you can do to switch to a more Mediterranean way of eating. What to eat  Eat a variety of fruits and vegetables each day, such as grapes, blueberries, tomatoes, broccoli, peppers, figs, olives, spinach, eggplant, beans, lentils, and chickpeas. Eat a variety of whole-grain foods each day, such as oats, brown rice, and whole wheat bread, pasta, and couscous. Eat fish at least 2 times a week. Try tuna, salmon, mackerel, lake trout, herring, or sardines. Eat moderate amounts of low-fat dairy products, such as milk, cheese, or yogurt. Eat moderate amounts of poultry and eggs. Choose healthy (unsaturated) fats, such as nuts, olive oil, and certain nut or seed oils like canola, soybean, and flaxseed. Limit unhealthy (saturated) fats, such as butter, palm oil, and coconut oil. And limit fats found in animal products, such as meat and dairy products made with whole milk. Try to eat red meat only a few times a month in very small amounts. Limit sweets and desserts to only a few times a week.  This includes sugar-sweetened drinks like soda. The Mediterranean diet may also include red wine with your meal--1 glass each day for women and up to 2 glasses a day for men. Tips for eating at home  Use herbs, spices, garlic, lemon zest, and citrus juice instead of salt to add flavor to foods. Add avocado slices to your sandwich instead of sanz. Have fish for lunch or dinner instead of red meat. Brush the fish with olive oil, and broil or grill it. Sprinkle your salad with seeds or nuts instead of cheese. Cook with olive or canola oil instead of butter or oils that are high in saturated fat. Switch from 2% milk or whole milk to 1% or fat-free milk. Dip raw vegetables in a vinaigrette dressing or hummus instead of dips made from mayonnaise or sour cream.  Have a piece of fruit for dessert instead of a piece of cake. Try baked apples, or have some dried fruit. Tips for eating out  Try broiled, grilled, baked, or poached fish instead of having it fried or breaded. Ask your  to have your meals prepared with olive oil instead of butter. Order dishes made with marinara sauce or sauces made from olive oil. Avoid sauces made from cream or mayonnaise. Choose whole-grain breads, whole wheat pasta and pizza crust, brown rice, beans, and lentils. Cut back on butter or margarine on bread. Instead, you can dip your bread in a small amount of olive oil. Ask for a side salad or grilled vegetables instead of french fries or chips. Where can you learn more? Go to http://www.woods.com/ and enter O407 to learn more about \"Learning About the Mediterranean Diet. \"  Current as of: May 9, 2022               Content Version: 13.5  © 5133-4255 Healthwise, Incorporated. Care instructions adapted under license by [facility] ([facility]). If you have questions about a medical condition or this instruction, always ask your healthcare professional. [place] [address] any warranty or liability for your use of this information. Please visit [email or web address] org for more information regarding cardiovascular disease prevention and treatment.

## 2023-01-24 PROCEDURE — 93296 REM INTERROG EVL PM/IDS: CPT | Performed by: INTERNAL MEDICINE

## 2023-01-24 PROCEDURE — 93294 REM INTERROG EVL PM/LDLS PM: CPT | Performed by: INTERNAL MEDICINE

## 2023-02-03 DIAGNOSIS — Z95.0 PACEMAKER: Primary | ICD-10-CM

## 2023-02-03 DIAGNOSIS — Q24.6 CONGENITAL HEART BLOCK: ICD-10-CM

## 2023-03-01 DIAGNOSIS — I48.0 PAF (PAROXYSMAL ATRIAL FIBRILLATION) (HCC): ICD-10-CM

## 2023-03-01 DIAGNOSIS — I45.9 STOKES-ADAMS SYNDROME: ICD-10-CM

## 2023-03-01 DIAGNOSIS — Z95.0 PACEMAKER: ICD-10-CM

## 2023-07-26 DIAGNOSIS — I45.9 STOKES-ADAMS SYNDROME: ICD-10-CM

## 2023-07-26 DIAGNOSIS — I48.0 PAF (PAROXYSMAL ATRIAL FIBRILLATION) (HCC): ICD-10-CM

## 2023-07-26 DIAGNOSIS — Z95.0 PACEMAKER: ICD-10-CM

## 2023-08-08 PROCEDURE — 93296 REM INTERROG EVL PM/IDS: CPT | Performed by: INTERNAL MEDICINE

## 2023-08-08 PROCEDURE — 93294 REM INTERROG EVL PM/LDLS PM: CPT | Performed by: INTERNAL MEDICINE

## 2023-12-12 ENCOUNTER — NURSE ONLY (OUTPATIENT)
Age: 43
End: 2023-12-12

## 2023-12-12 DIAGNOSIS — I44.0 FIRST DEGREE AV BLOCK: Primary | ICD-10-CM

## 2023-12-12 DIAGNOSIS — Q24.6 CONGENITAL HEART BLOCK: ICD-10-CM

## 2024-02-05 PROCEDURE — 93296 REM INTERROG EVL PM/IDS: CPT | Performed by: INTERNAL MEDICINE

## 2024-02-05 PROCEDURE — 93294 REM INTERROG EVL PM/LDLS PM: CPT | Performed by: INTERNAL MEDICINE

## 2024-07-11 ENCOUNTER — TELEPHONE (OUTPATIENT)
Age: 44
End: 2024-07-11

## 2024-07-11 NOTE — TELEPHONE ENCOUNTER
Pt was told if the medication she took for acid refulx helped with her chest discomfort, it's not her heart. Also, since she is using her upper body to compensate for her lower extremity, she is probably using muscles that she really hasn't used a lot, which could also be causing generalized soreness to chest area.     Pt states it's not localized left sided chest pain.                  ----- Message -----  From: Smitha Jacob  Sent: 7/11/2024   8:26 AM EDT  To: l [facility] Cardiology Clinical Staff  Subject: About chest pain                                  Hey Mrs. Harrison. I just had surgery yesterday to remove some hardware out of kneecap and everything went good. I am taking a aspirin twice daily, but my chest has been hurting me and I don't know if it is because I have use my upper strength to get myself on the bed. My blood pressure is 127/58 with a heart rate of 75. It could be acid reflux too because I took something for heartburn and it helped. When I woke up it's like my chest is sore. Just wanted to talk to you about this.

## 2024-07-11 NOTE — TELEPHONE ENCOUNTER
----- Message from Jocelyn Kerns MA sent at 7/11/2024  9:16 AM EDT -----  Regarding: FW: About chest pain  Contact: [phone]    ----- Message -----  From: Bernie De Santiago MA  Sent: 7/11/2024   8:30 AM EDT  To: Jocelyn Kerns MA  Subject: FW: About chest pain                               ----- Message -----  From: Smitha Jacob  Sent: 7/11/2024   8:26 AM EDT  To: [facility] Cardiology Clinical Staff  Subject: About chest pain                                 Hey Mrs. Harrison. I just had surgery yesterday to remove some hardware out of kneecap and everything went good. I am taking a aspirin twice daily, but my chest has been hurting me and I don't know if it is because I have use my upper strength to get myself on the bed. My blood pressure is 127/58 with a heart rate of 75. It could be acid reflux too because I took something for heartburn and it helped. When I woke up it's like my chest is sore. Just wanted to talk to you about this.

## 2024-07-17 NOTE — PROGRESS NOTES
[facility] CARDIOLOGY  [address] [place] [address], [address]  [place] [address]  PHONE: [phone]      24    NAME:  Smitha Jacob  : 1980  MRN: [id number]         SUBJECTIVE:   Smitha Jacob is a 44 y.o. female seen for a follow up visit regarding the following:     Chief Complaint   Patient presents with    Shortness of Breath            HPI:  Follow up  Shortness of Breath   .    Prior MVR, asd repair and pacemaker, chronic dyspnea, fatigue and chest pain,  anxiety, PTSD. She went to ER in [place] on 24 for chest discomfort with negative workup.  Called triage requesting us to review her EKG because it was \"abnormal\".  Can't see the tracing but reviewed the note - showed A-V pacing, and she is known to be 100% RV paced.  CT chest all clear, normal heart size, no vascular calcification.     She had just had some hardware removed from her knee and started having chest discomfort, initially relieved by Pepto Bismol but the day of her ER visit it didn't so she had it checked  she was worried because she read on my chart that her EKG was abnormal.  She feels back to normal and has had no further discomfort.           Past cardiac history:   ASD repair at [place] age 10; MVR at [place] in .    Pacemaker since age 10    Echo 2016 - normal LV function, mildly increased MVR gradients, mild to mod AI, RVSP 43.    2021- COVID-19 infection   2021- Echo normal SF, EF 65%, ind DF, MVR mean gradient 7 (down from 10), no MR.  Mild to mod AI   Dec 2021- low normal EF 50%, normal MVR mean gradient 5, mild AI, TDS  2023       Echo - EF 50-55%, mild AI, normal MVR, mod LAE, mild ERMA                  Cardiac Medications       Salicylates       aspirin 81 MG EC tablet Take 1 tablet by mouth daily                  Past Medical History, Past Surgical History, Family history, Social History, and Medications were all reviewed with the patient today and updated as necessary.     Prior to

## 2024-07-19 ENCOUNTER — OFFICE VISIT (OUTPATIENT)
Age: 44
End: 2024-07-19
Payer: COMMERCIAL

## 2024-07-19 VITALS
HEART RATE: 84 BPM | DIASTOLIC BLOOD PRESSURE: 78 MMHG | BODY MASS INDEX: 28.88 KG/M2 | HEIGHT: 63 IN | SYSTOLIC BLOOD PRESSURE: 138 MMHG | WEIGHT: 163 LBS

## 2024-07-19 DIAGNOSIS — R07.89 CHEST DISCOMFORT: Primary | ICD-10-CM

## 2024-07-19 DIAGNOSIS — Q24.6 CONGENITAL HEART BLOCK: ICD-10-CM

## 2024-07-19 DIAGNOSIS — Z95.0 PACEMAKER: ICD-10-CM

## 2024-07-19 DIAGNOSIS — Z95.2 S/P MVR (MITRAL VALVE REPLACEMENT): ICD-10-CM

## 2024-07-19 DIAGNOSIS — Z87.74 H/O CONGENITAL ATRIAL SEPTAL DEFECT (ASD) REPAIR: ICD-10-CM

## 2024-07-19 PROCEDURE — 99214 OFFICE O/P EST MOD 30 MIN: CPT | Performed by: INTERNAL MEDICINE

## 2024-07-19 ASSESSMENT — ENCOUNTER SYMPTOMS: SHORTNESS OF BREATH: 0

## 2025-01-05 ENCOUNTER — APPOINTMENT (OUTPATIENT)
Dept: GENERAL RADIOLOGY | Age: 45
End: 2025-01-05
Payer: COMMERCIAL

## 2025-01-05 ENCOUNTER — HOSPITAL ENCOUNTER (EMERGENCY)
Age: 45
Discharge: HOME | End: 2025-01-05
Attending: EMERGENCY MEDICINE
Payer: COMMERCIAL

## 2025-01-05 VITALS
RESPIRATION RATE: 16 BRPM | TEMPERATURE: 98 F | DIASTOLIC BLOOD PRESSURE: 65 MMHG | HEIGHT: 63 IN | BODY MASS INDEX: 28.88 KG/M2 | WEIGHT: 163 LBS | SYSTOLIC BLOOD PRESSURE: 127 MMHG | HEART RATE: 68 BPM | OXYGEN SATURATION: 97 %

## 2025-01-05 DIAGNOSIS — R07.9 CHEST PAIN, UNSPECIFIED TYPE: Primary | ICD-10-CM

## 2025-01-05 LAB
ALBUMIN SERPL-MCNC: 3.6 G/DL (ref 3.5–5)
ALBUMIN/GLOB SERPL: 1.2 (ref 1–1.9)
ALP SERPL-CCNC: 87 U/L (ref 35–104)
ALT SERPL-CCNC: 30 U/L (ref 8–45)
ANION GAP SERPL CALC-SCNC: 10 MMOL/L (ref 7–16)
AST SERPL-CCNC: 28 U/L (ref 15–37)
BASOPHILS # BLD: 0.1 K/UL (ref 0–0.2)
BASOPHILS NFR BLD: 1 % (ref 0–2)
BILIRUB SERPL-MCNC: 0.3 MG/DL (ref 0–1.2)
BUN SERPL-MCNC: 14 MG/DL (ref 6–23)
CALCIUM SERPL-MCNC: 9.5 MG/DL (ref 8.8–10.2)
CHLORIDE SERPL-SCNC: 103 MMOL/L (ref 98–107)
CO2 SERPL-SCNC: 24 MMOL/L (ref 20–29)
CREAT SERPL-MCNC: 0.78 MG/DL (ref 0.6–1.1)
DIFFERENTIAL METHOD BLD: NORMAL
EOSINOPHIL # BLD: 0.1 K/UL (ref 0–0.8)
EOSINOPHIL NFR BLD: 2 % (ref 0.5–7.8)
ERYTHROCYTE [DISTWIDTH] IN BLOOD BY AUTOMATED COUNT: 13.2 % (ref 11.9–14.6)
GLOBULIN SER CALC-MCNC: 2.9 G/DL (ref 2.3–3.5)
GLUCOSE SERPL-MCNC: 97 MG/DL (ref 70–99)
HCT VFR BLD AUTO: 39.4 % (ref 35.8–46.3)
HGB BLD-MCNC: 13.2 G/DL (ref 11.7–15.4)
IMM GRANULOCYTES # BLD AUTO: 0 K/UL (ref 0–0.5)
IMM GRANULOCYTES NFR BLD AUTO: 0 % (ref 0–5)
LYMPHOCYTES # BLD: 1.8 K/UL (ref 0.5–4.6)
LYMPHOCYTES NFR BLD: 27 % (ref 13–44)
MCH RBC QN AUTO: 29.8 PG (ref 26.1–32.9)
MCHC RBC AUTO-ENTMCNC: 33.5 G/DL (ref 31.4–35)
MCV RBC AUTO: 88.9 FL (ref 82–102)
MONOCYTES # BLD: 0.4 K/UL (ref 0.1–1.3)
MONOCYTES NFR BLD: 6 % (ref 4–12)
NEUTS SEG # BLD: 4.2 K/UL (ref 1.7–8.2)
NEUTS SEG NFR BLD: 64 % (ref 43–78)
NRBC # BLD: 0 K/UL (ref 0–0.2)
PLATELET # BLD AUTO: 302 K/UL (ref 150–450)
PMV BLD AUTO: 10.3 FL (ref 9.4–12.3)
POTASSIUM SERPL-SCNC: 4.8 MMOL/L (ref 3.5–5.1)
PROT SERPL-MCNC: 6.5 G/DL (ref 6.3–8.2)
RBC # BLD AUTO: 4.43 M/UL (ref 4.05–5.2)
SODIUM SERPL-SCNC: 137 MMOL/L (ref 136–145)
TROPONIN T SERPL HS-MCNC: <6 NG/L (ref 0–14)
TROPONIN T SERPL HS-MCNC: <6 NG/L (ref 0–14)
WBC # BLD AUTO: 6.6 K/UL (ref 4.3–11.1)

## 2025-01-05 PROCEDURE — 84484 ASSAY OF TROPONIN QUANT: CPT

## 2025-01-05 PROCEDURE — 80053 COMPREHEN METABOLIC PANEL: CPT

## 2025-01-05 PROCEDURE — 85025 COMPLETE CBC W/AUTO DIFF WBC: CPT

## 2025-01-05 PROCEDURE — 71046 X-RAY EXAM CHEST 2 VIEWS: CPT

## 2025-01-05 PROCEDURE — 99285 EMERGENCY DEPT VISIT HI MDM: CPT

## 2025-01-05 RX ORDER — NAPROXEN SODIUM 550 MG/1
550 TABLET ORAL 2 TIMES DAILY WITH MEALS
Qty: 30 TABLET | Refills: 0 | Status: SHIPPED | OUTPATIENT
Start: 2025-01-05

## 2025-01-05 ASSESSMENT — LIFESTYLE VARIABLES
HOW MANY STANDARD DRINKS CONTAINING ALCOHOL DO YOU HAVE ON A TYPICAL DAY: PATIENT DOES NOT DRINK
HOW OFTEN DO YOU HAVE A DRINK CONTAINING ALCOHOL: NEVER

## 2025-01-05 ASSESSMENT — PAIN - FUNCTIONAL ASSESSMENT: PAIN_FUNCTIONAL_ASSESSMENT: NONE - DENIES PAIN

## 2025-01-05 NOTE — DISCHARGE INSTRUCTIONS
As we discussed, your testing done here shows no signs of any serious or life-threatening illnesses  I encourage to follow your primary care provider  Return to the ER for any new, worsening or life-threatening symptom

## 2025-01-05 NOTE — ED NOTES
I have reviewed discharge instructions with the patient and spouse.  The patient and spouse verbalized understanding.    Patient left ED via Discharge Method: ambulatory to Home with family.    Opportunity for questions and clarification provided.       Patient given 1 scripts.         To continue your aftercare when you leave the hospital, you may receive an automated call from our care team to check in on how you are doing.  This is a free service and part of our promise to provide the best care and service to meet your aftercare needs.” If you have questions, or wish to unsubscribe from this service please call [phone].  Thank you for Choosing our [facility] Emergency Department.

## 2025-01-05 NOTE — ED PROVIDER NOTES
Emergency Department Provider Note       PCP: Rolf Guzman DO   Age: 44 y.o.   Sex: female     DISPOSITION Decision To Discharge 01/05/2025 02:33:54 PM    ICD-10-CM    1. Chest pain, unspecified type  R07.9           Medical Decision Making     Plan for screening labs EKG and chest x-ray.  Will continue to monitor symptoms.    1:22 PM EST  Initial laboratory data here is reassuring including troponin and chemistry panels.  Chest x-ray is clear as well.  Awaiting second troponin.  Patient remains hemodynamically stable and well-appearing    2:34 PM EST  Second troponin is normal as well.  Plan to discharge home.  Discussed that symptoms could be muscle skeletal in nature.  She has a cardiologist she can follow-up with.  However will trial NSAIDs as well for possible muscle skeletal chest pain     1 acute illness with systemic symptoms.  Prescription drug management performed.  Chronic medical problems impacting care include history of previous valve replacement.  Shared medical decision making was utilized in creating the patients health plan today.  I independently ordered and reviewed each unique test.    I reviewed external records: ED visit note from a different ED.   I reviewed external records: provider visit note from PCP.  I reviewed external records: provider visit note from outside specialist.  I reviewed external records: previous EKG including cardiologist interpretation.    I reviewed external records: previous lab results from outside ED.  I reviewed external records: previous imaging study including radiologist interpretation.       I interpreted the X-rays no pneumothorax.    EKG interpretation, independent of cardiologist: Paced rhythm, rate of 67, no gross ST segment changes noted            History     Patient presents the ER with complaints of chest pain.  Patient states symptoms started about 2 nights ago.  [place]  States she woke up with pain which she 
normal...

## 2025-01-05 NOTE — ED TRIAGE NOTES
Per patient intermittent non radiating mediastinal chest tightness since last Thursday. Attempted antiacids with no relief.  States multiple heart surgery. Pacemaker since 10 years old. States of nausea denies v/d. Denies fever/chills. Intermittent sob.

## 2025-01-29 ENCOUNTER — TELEPHONE (OUTPATIENT)
Age: 45
End: 2025-01-29

## 2025-01-29 NOTE — TELEPHONE ENCOUNTER
Elizabeth Chew MD Schaaf, Cara, RN  Caller: Unspecified (Today,  8:57 AM)  Yes, just monitor with active flu infection

## 2025-01-29 NOTE — TELEPHONE ENCOUNTER
[place] alert for possible AT vs PAFL events since yesterday.     Patient w/ active flu and bedridden.   Missed synthroid yesterday, took it today.    Using inhaler as needed.     ? Just monitor for now            Prior MVR, asd repair and pacemaker, chronic dyspnea, fatigue and chest pain,  anxiety, PTSD. She went to ER in [place] on 7/13/24 for chest discomfort with negative workup.  Called triage requesting us to review her EKG because it was \"abnormal\".  Can't see the tracing but reviewed the note - showed A-V pacing, and she is known to be 100% RV paced.  CT chest all clear, normal heart size, no vascular calcification.      She had just had some hardware removed from her knee and started having chest discomfort, initially relieved by Pepto Bismol but the day of her ER visit it didn't so she had it checked  she was worried because she read on my chart that her EKG was abnormal.  She feels back to normal and has had no further discomfort.           Past cardiac history:   ASD repair at [place] age 10; MVR at [place] in 2009.    Pacemaker since age 10    Echo 2016 - normal LV function, mildly increased MVR gradients, mild to mod AI, RVSP 43.    Jan 2021- COVID-19 infection   Feb 2021- Echo normal SF, EF 65%, ind DF, MVR mean gradient 7 (down from 10), no MR.  Mild to mod AI   Dec 2021- low normal EF 50%, normal MVR mean gradient 5, mild AI, TDS  Jan 2023       Echo - EF 50-55%, mild AI, normal MVR, mod LAE, mild ERMA

## 2025-05-08 PROCEDURE — 93294 REM INTERROG EVL PM/LDLS PM: CPT | Performed by: INTERNAL MEDICINE

## 2025-05-08 PROCEDURE — 93296 REM INTERROG EVL PM/IDS: CPT | Performed by: INTERNAL MEDICINE

## 2025-06-19 ENCOUNTER — TELEPHONE (OUTPATIENT)
Age: 45
End: 2025-06-19

## 2025-06-19 NOTE — TELEPHONE ENCOUNTER
Cardiac Clearance        Physician or Practice Requesting:[facility] for Oral & facial surgery   : ?  Contact Phone Number: [phone]  Fax Number: [phone]  Date of Surgery/Procedure: TBD  Type of Surgery or Procedure: oral surgery   Type of Anesthesia: under Light sedation ( IV Sedation)   Type of Clearance Requested: risk assessment and any medication hold   Medication to Hold:?  Days to Hold: ?

## 2025-06-21 NOTE — TELEPHONE ENCOUNTER
While there are no meds to hold, she will need abx prophylaxis - 2 grams amoxicillin 30-60 minutes prior (mitral valve)

## 2025-07-18 NOTE — PROGRESS NOTES
[facility] CARDIOLOGY  [address] [place] [address], [address]  [place] [address]  PHONE: [phone]      25    NAME:  Smitha Jacob  : 1980  MRN: [id number]         SUBJECTIVE:   Smitha Jacob is a 45 y.o. female seen for a follow up visit regarding the following:     Chief Complaint   Patient presents with    Follow-up    Hyperlipidemia            HPI:  Follow up  Follow-up and Hyperlipidemia   .    Prior MVR, asd repair and pacemaker, chronic dyspnea, fatigue and chest pain,  anxiety, PTSD. 100% RV paced. Missed her follow up echo last year  She says she's been doing well.  Her insurance company paid for a BP cuff so she started checking it, she became concerned because a couple of times it registered an irregular heart beat.  Sometimes she feels her heart is racing mostly at night, better with deep breath.  Her BP has been controlled. She is also beginning to experience perimenopausal symptoms including hot flashes. Otherwise, she has felt very well.  Her pacer checks have demonstrated no arrhythmia.  Normal pacer function.      She is anticipating extraction of all four molars tomorrow.  She's been told they will give her IV abx along with her IV sedation.         Past cardiac history:   ASD repair at [place] age 10; MVR at [place] in .    Pacemaker since age 10    Echo 2016 - normal LV function, mildly increased MVR gradients, mild to mod AI, RVSP 43.    2021- COVID-19 infection   2021- Echo normal SF, EF 65%, ind DF, MVR mean gradient 7 (down from 10), no MR.  Mild to mod AI   Dec 2021- low normal EF 50%, normal MVR mean gradient 5, mild AI, TDS  2023       Echo - EF 50-55%, mild AI, normal MVR, mod LAE, mild ERMA                  Cardiac Medications       Salicylates       aspirin 81 MG EC tablet Take 1 tablet by mouth daily                  Past Medical History, Past Surgical History, Family history, Social History, and Medications were all reviewed with the patient

## 2025-07-21 ENCOUNTER — OFFICE VISIT (OUTPATIENT)
Age: 45
End: 2025-07-21
Payer: COMMERCIAL

## 2025-07-21 VITALS
HEIGHT: 63 IN | HEART RATE: 93 BPM | SYSTOLIC BLOOD PRESSURE: 120 MMHG | DIASTOLIC BLOOD PRESSURE: 82 MMHG | BODY MASS INDEX: 29.59 KG/M2 | WEIGHT: 167 LBS

## 2025-07-21 DIAGNOSIS — Z95.3 H/O HEART VALVE REPLACEMENT WITH BIOPROSTHETIC VALVE: ICD-10-CM

## 2025-07-21 DIAGNOSIS — Q21.10 ASD (ATRIAL SEPTAL DEFECT): ICD-10-CM

## 2025-07-21 DIAGNOSIS — Z95.0 PACEMAKER: Primary | ICD-10-CM

## 2025-07-21 DIAGNOSIS — Q24.6 CONGENITAL HEART BLOCK: ICD-10-CM

## 2025-07-21 PROCEDURE — 99214 OFFICE O/P EST MOD 30 MIN: CPT | Performed by: INTERNAL MEDICINE

## 2025-07-21 RX ORDER — AZITHROMYCIN 500 MG/1
500 TABLET, FILM COATED ORAL
Qty: 1 TABLET | Refills: 0 | Status: SHIPPED | OUTPATIENT
Start: 2025-07-21

## 2025-07-21 ASSESSMENT — ENCOUNTER SYMPTOMS: SHORTNESS OF BREATH: 0
